# Patient Record
Sex: FEMALE | Race: WHITE | NOT HISPANIC OR LATINO | Employment: FULL TIME | URBAN - METROPOLITAN AREA
[De-identification: names, ages, dates, MRNs, and addresses within clinical notes are randomized per-mention and may not be internally consistent; named-entity substitution may affect disease eponyms.]

---

## 2023-01-25 ENCOUNTER — LAB REQUISITION (OUTPATIENT)
Dept: LAB | Facility: CLINIC | Age: 23
End: 2023-01-25
Payer: COMMERCIAL

## 2023-01-25 DIAGNOSIS — R10.13 EPIGASTRIC PAIN: ICD-10-CM

## 2023-01-25 DIAGNOSIS — R00.2 PALPITATIONS: ICD-10-CM

## 2023-01-25 DIAGNOSIS — Z32.01 PREGNANCY TEST POSITIVE: ICD-10-CM

## 2023-01-25 LAB
ALBUMIN SERPL BCG-MCNC: 4 G/DL (ref 3.5–5.2)
ALP SERPL-CCNC: 142 U/L (ref 35–104)
ALT SERPL W P-5'-P-CCNC: 18 U/L (ref 10–35)
ANION GAP SERPL CALCULATED.3IONS-SCNC: 13 MMOL/L (ref 7–15)
AST SERPL W P-5'-P-CCNC: 15 U/L (ref 10–35)
BILIRUB SERPL-MCNC: 0.2 MG/DL
BUN SERPL-MCNC: 8.7 MG/DL (ref 6–20)
CALCIUM SERPL-MCNC: 8.6 MG/DL (ref 8.6–10)
CHLORIDE SERPL-SCNC: 105 MMOL/L (ref 98–107)
CREAT SERPL-MCNC: 0.55 MG/DL (ref 0.51–0.95)
DEPRECATED HCO3 PLAS-SCNC: 18 MMOL/L (ref 22–29)
GFR SERPL CREATININE-BSD FRML MDRD: >90 ML/MIN/1.73M2
GLUCOSE SERPL-MCNC: 99 MG/DL (ref 70–99)
MAGNESIUM SERPL-MCNC: 2 MG/DL (ref 1.7–2.3)
POTASSIUM SERPL-SCNC: 3.8 MMOL/L (ref 3.4–5.3)
PROT SERPL-MCNC: 6.9 G/DL (ref 6.4–8.3)
SODIUM SERPL-SCNC: 136 MMOL/L (ref 136–145)
TSH SERPL DL<=0.005 MIU/L-ACNC: 2.9 UIU/ML (ref 0.3–4.2)

## 2023-01-25 PROCEDURE — 80053 COMPREHEN METABOLIC PANEL: CPT

## 2023-01-25 PROCEDURE — 83735 ASSAY OF MAGNESIUM: CPT | Mod: ORL

## 2023-01-25 PROCEDURE — 84443 ASSAY THYROID STIM HORMONE: CPT | Mod: ORL

## 2023-01-27 ENCOUNTER — TRANSCRIBE ORDERS (OUTPATIENT)
Dept: OTHER | Age: 23
End: 2023-01-27

## 2023-01-27 DIAGNOSIS — G89.29 CHRONIC PAIN OF BOTH KNEES: Primary | ICD-10-CM

## 2023-01-27 DIAGNOSIS — M25.562 CHRONIC PAIN OF BOTH KNEES: Primary | ICD-10-CM

## 2023-01-27 DIAGNOSIS — M25.561 CHRONIC PAIN OF BOTH KNEES: Primary | ICD-10-CM

## 2023-01-30 ENCOUNTER — LAB REQUISITION (OUTPATIENT)
Dept: LAB | Facility: CLINIC | Age: 23
End: 2023-01-30
Payer: COMMERCIAL

## 2023-01-30 DIAGNOSIS — Z32.01 PREGNANCY TEST POSITIVE: Primary | ICD-10-CM

## 2023-01-30 DIAGNOSIS — R10.13 EPIGASTRIC PAIN: ICD-10-CM

## 2023-01-30 PROCEDURE — 87338 HPYLORI STOOL AG IA: CPT

## 2023-01-31 LAB — H PYLORI AG STL QL IA: POSITIVE

## 2023-02-13 ENCOUNTER — HOSPITAL ENCOUNTER (OUTPATIENT)
Dept: ULTRASOUND IMAGING | Facility: CLINIC | Age: 23
Discharge: HOME | End: 2023-02-13
Attending: ADVANCED PRACTICE MIDWIFE | Admitting: ADVANCED PRACTICE MIDWIFE
Payer: COMMERCIAL

## 2023-02-13 DIAGNOSIS — Z32.01 PREGNANCY TEST POSITIVE: ICD-10-CM

## 2023-02-13 PROCEDURE — 76801 OB US < 14 WKS SINGLE FETUS: CPT | Mod: 26 | Performed by: RADIOLOGY

## 2023-02-13 PROCEDURE — 76801 OB US < 14 WKS SINGLE FETUS: CPT

## 2023-02-20 ENCOUNTER — LAB REQUISITION (OUTPATIENT)
Dept: LAB | Facility: CLINIC | Age: 23
End: 2023-02-20
Payer: COMMERCIAL

## 2023-02-20 DIAGNOSIS — O09.91 SUPERVISION OF HIGH RISK PREGNANCY, UNSPECIFIED, FIRST TRIMESTER: ICD-10-CM

## 2023-02-20 LAB
ABO/RH(D): NORMAL
ANTIBODY SCREEN: NEGATIVE
DEPRECATED CALCIDIOL+CALCIFEROL SERPL-MC: 4 UG/L (ref 20–75)
HBV SURFACE AB SERPL IA-ACNC: 4.45 M[IU]/ML
HBV SURFACE AB SERPL IA-ACNC: NONREACTIVE M[IU]/ML
HBV SURFACE AG SERPL QL IA: NONREACTIVE
HCV AB SERPL QL IA: NONREACTIVE
SPECIMEN EXPIRATION DATE: NORMAL

## 2023-02-20 PROCEDURE — 86762 RUBELLA ANTIBODY: CPT | Mod: ORL | Performed by: ADVANCED PRACTICE MIDWIFE

## 2023-02-20 PROCEDURE — 86787 VARICELLA-ZOSTER ANTIBODY: CPT | Mod: ORL | Performed by: ADVANCED PRACTICE MIDWIFE

## 2023-02-20 PROCEDURE — 87086 URINE CULTURE/COLONY COUNT: CPT | Mod: ORL | Performed by: ADVANCED PRACTICE MIDWIFE

## 2023-02-20 PROCEDURE — 86780 TREPONEMA PALLIDUM: CPT | Mod: ORL | Performed by: ADVANCED PRACTICE MIDWIFE

## 2023-02-20 PROCEDURE — 86901 BLOOD TYPING SEROLOGIC RH(D): CPT | Mod: ORL | Performed by: ADVANCED PRACTICE MIDWIFE

## 2023-02-20 PROCEDURE — 85660 RBC SICKLE CELL TEST: CPT | Mod: ORL | Performed by: ADVANCED PRACTICE MIDWIFE

## 2023-02-20 PROCEDURE — 86706 HEP B SURFACE ANTIBODY: CPT | Mod: ORL | Performed by: ADVANCED PRACTICE MIDWIFE

## 2023-02-20 PROCEDURE — 83020 HEMOGLOBIN ELECTROPHORESIS: CPT | Mod: ORL | Performed by: ADVANCED PRACTICE MIDWIFE

## 2023-02-20 PROCEDURE — 82306 VITAMIN D 25 HYDROXY: CPT | Mod: ORL | Performed by: ADVANCED PRACTICE MIDWIFE

## 2023-02-20 PROCEDURE — 86850 RBC ANTIBODY SCREEN: CPT | Mod: ORL | Performed by: ADVANCED PRACTICE MIDWIFE

## 2023-02-20 PROCEDURE — 86803 HEPATITIS C AB TEST: CPT | Mod: ORL | Performed by: ADVANCED PRACTICE MIDWIFE

## 2023-02-20 PROCEDURE — 87340 HEPATITIS B SURFACE AG IA: CPT | Mod: ORL | Performed by: ADVANCED PRACTICE MIDWIFE

## 2023-02-21 LAB
RUBV IGG SERPL QL IA: 16.5 INDEX
RUBV IGG SERPL QL IA: POSITIVE
T PALLIDUM AB SER QL: NONREACTIVE
VZV IGG SER QL IA: 606 INDEX
VZV IGG SER QL IA: POSITIVE

## 2023-02-22 LAB
BACTERIA UR CULT: NO GROWTH
HGB A1 MFR BLD: 96.8 %
HGB A2 MFR BLD: 3 %
HGB C MFR BLD: 0 %
HGB E MFR BLD: 0 %
HGB F MFR BLD: 0.2 %
HGB FRACT BLD ELPH-IMP: NORMAL
HGB OTHER MFR BLD: 0 %
HGB S BLD QL SOLY: NORMAL
HGB S MFR BLD: 0 %
PATH INTERP BLD-IMP: NORMAL

## 2023-03-08 ENCOUNTER — HOSPITAL ENCOUNTER (EMERGENCY)
Facility: CLINIC | Age: 23
Discharge: HOME | End: 2023-03-08
Attending: EMERGENCY MEDICINE | Admitting: EMERGENCY MEDICINE
Payer: COMMERCIAL

## 2023-03-08 VITALS — OXYGEN SATURATION: 100 %

## 2023-03-08 DIAGNOSIS — O21.0 HYPEREMESIS GRAVIDARUM: ICD-10-CM

## 2023-03-08 LAB
ANION GAP SERPL CALCULATED.3IONS-SCNC: 9 MMOL/L (ref 7–15)
BUN SERPL-MCNC: 6.2 MG/DL (ref 6–20)
CALCIUM SERPL-MCNC: 9.3 MG/DL (ref 8.6–10)
CHLORIDE SERPL-SCNC: 104 MMOL/L (ref 98–107)
CREAT SERPL-MCNC: 0.47 MG/DL (ref 0.51–0.95)
DEPRECATED HCO3 PLAS-SCNC: 22 MMOL/L (ref 22–29)
GFR SERPL CREATININE-BSD FRML MDRD: >90 ML/MIN/1.73M2
GLUCOSE SERPL-MCNC: 82 MG/DL (ref 70–99)
HGB BLD-MCNC: 11.8 G/DL (ref 11.7–15.7)
HOLD SPECIMEN: NORMAL
HOLD SPECIMEN: NORMAL
POTASSIUM SERPL-SCNC: 3.9 MMOL/L (ref 3.4–5.3)
SODIUM SERPL-SCNC: 135 MMOL/L (ref 136–145)

## 2023-03-08 PROCEDURE — 96374 THER/PROPH/DIAG INJ IV PUSH: CPT

## 2023-03-08 PROCEDURE — 99284 EMERGENCY DEPT VISIT MOD MDM: CPT | Mod: 25

## 2023-03-08 PROCEDURE — 258N000003 HC RX IP 258 OP 636: Performed by: EMERGENCY MEDICINE

## 2023-03-08 PROCEDURE — 85018 HEMOGLOBIN: CPT | Performed by: EMERGENCY MEDICINE

## 2023-03-08 PROCEDURE — 36415 COLL VENOUS BLD VENIPUNCTURE: CPT | Performed by: EMERGENCY MEDICINE

## 2023-03-08 PROCEDURE — 82310 ASSAY OF CALCIUM: CPT | Performed by: EMERGENCY MEDICINE

## 2023-03-08 PROCEDURE — 96361 HYDRATE IV INFUSION ADD-ON: CPT

## 2023-03-08 PROCEDURE — 250N000011 HC RX IP 250 OP 636: Performed by: EMERGENCY MEDICINE

## 2023-03-08 RX ORDER — ONDANSETRON 2 MG/ML
4 INJECTION INTRAMUSCULAR; INTRAVENOUS
Status: COMPLETED | OUTPATIENT
Start: 2023-03-08 | End: 2023-03-08

## 2023-03-08 RX ORDER — PROCHLORPERAZINE MALEATE 10 MG
10 TABLET ORAL EVERY 6 HOURS PRN
Qty: 20 TABLET | Refills: 0 | Status: ON HOLD | OUTPATIENT
Start: 2023-03-08 | End: 2023-09-21

## 2023-03-08 RX ADMIN — SODIUM CHLORIDE 1000 ML: 9 INJECTION, SOLUTION INTRAVENOUS at 20:42

## 2023-03-08 RX ADMIN — ONDANSETRON 4 MG: 2 INJECTION INTRAMUSCULAR; INTRAVENOUS at 20:45

## 2023-03-08 ASSESSMENT — ACTIVITIES OF DAILY LIVING (ADL): ADLS_ACUITY_SCORE: 35

## 2023-03-09 NOTE — ED TRIAGE NOTES
BIBA from home with ; pt is 2-3 months pregnant and having nausea and vomiting; onset a couple days ago. Croatian speaking only.  speaks some english     Triage Assessment     Row Name 03/08/23 2025       Triage Assessment (Adult)    Airway WDL WDL       Respiratory WDL    Respiratory WDL WDL       Skin Circulation/Temperature WDL    Skin Circulation/Temperature WDL WDL       Cardiac WDL    Cardiac WDL WDL       Peripheral/Neurovascular WDL    Peripheral Neurovascular WDL WDL       Cognitive/Neuro/Behavioral WDL    Cognitive/Neuro/Behavioral WDL WDL

## 2023-03-09 NOTE — ED PROVIDER NOTES
"  History     Chief Complaint:  Nausea & Vomiting (2-3 months pregnant)       CANDY Sim is a 23 year old female with a history of 2 prior live births and a current pregnancy at 8 weeks by dates presents to us because of nausea and vomiting.  She notes that she had issues with hyperemesis during prior pregnancies as well.  She notes that she suffers 2-3 spells of emesis each day and feels generally nauseated.  However, she has been able to tolerate liquids and has been urinating and having normal bowel movements.  She denies abdominal pain or vaginal bleeding.  She visited with her primary care doctor earlier today who was prescribed Zofran, but she states that after taking a tablet that \"it did not work and I threw up again.\"  She denies other complaints at this juncture.      Independent Historian:   None - Patient Only with history obtained through an     Review of External Notes: Yes I reviewed her family practice notes from earlier today    ROS:  Review of Systems  Pertinent positives and negatives as above.  A 14-point review of systems was performed with all other systems reviewed as negative.    Allergies:  Penicillins     Medications:    Zofran ODT  Prenatal ferrous fumarate    Past Medical History:    High risk pregnancy, first trimester    Past Surgical History:    Appendectomy     Social History:  Presents to the ED via EMS with a family member.  PCP: No primary care provider on file.     Physical Exam     Patient Vitals for the past 24 hrs:   SpO2   03/08/23 2030 100 %     Physical Exam    Eye:  Pupils are equal, round, and reactive.  Extraocular movements intact.    ENT:  No rhinorrhea.  Moist mucus membranes.  Normal tongue and tonsil.    Cardiac:  Regular rate and rhythm.  No murmurs, gallops, or rubs.    Pulmonary:  Clear to auscultation bilaterally.  No wheezes, rales, or rhonchi.    Abdomen:  Positive bowel sounds.  Abdomen is soft and non-distended, without focal " tenderness.    Musculoskeletal:  Normal movement of all extremities without evidence for deficit.    Skin:  Warm and dry without rashes.    Neurologic:  Non-focal exam without asymmetric weakness or numbness.     Psychiatric:  Normal affect with appropriate interaction with examiner.    Emergency Department Course     Laboratory:  Labs Ordered and Resulted from Time of ED Arrival to Time of ED Departure   BASIC METABOLIC PANEL - Abnormal       Result Value    Sodium 135 (*)     Potassium 3.9      Chloride 104      Carbon Dioxide (CO2) 22      Anion Gap 9      Urea Nitrogen 6.2      Creatinine 0.47 (*)     Calcium 9.3      Glucose 82      GFR Estimate >90     HEMOGLOBIN - Normal    Hemoglobin 11.8       Emergency Department Course & Assessments:  Interventions:  Medications   ondansetron (ZOFRAN) injection 4 mg (4 mg Intravenous $Given 3/8/23 2045)   0.9% sodium chloride BOLUS (0 mLs Intravenous Stopped 3/8/23 2228)     Assessments:  2205 I obtained history and examined the patient as noted above. I believe that they are safe for discharge at this time.     Social Determinants of Health affecting care:   None    Disposition:  The patient was discharged to home.     Impression & Plan    CMS Diagnoses: None      Medical Decision Making:  This healthy 23-year-old woman presents to us with hyperemesis gravidarum.  Vital signs are reassuring.  She appears clinically well.  Her abdomen is soft and benign.  She has moist mucous membranes.  She was given a liter of fluid along with Zofran with improvement in symptoms.  Chemistry panel is reassuring.  Hemoglobin is normal.  At this juncture, I feel she is safe for discharge home.  We discussed hyperemesis related to pregnancy.  She will continue with Zofran we will consider a trial of Compazine as well.  She will otherwise return to us for worsening of condition or other emergent concerns.    Diagnosis:    ICD-10-CM    1. Hyperemesis gravidarum  O21.0         Discharge  Medications:  Discharge Medication List as of 3/8/2023 10:28 PM      START taking these medications    Details   prochlorperazine (COMPAZINE) 10 MG tablet Take 1 tablet (10 mg) by mouth every 6 hours as needed for nausea or vomiting, Disp-20 tablet, R-0, Local Print           Scribe Disclosure:  I, Jayson Dan, am serving as a scribe at 10:17 PM on 3/8/2023 to document services personally performed by Trierweiler, Chad A, MD based on my observations and the provider's statements to me.   3/8/2023   Trierweiler, Chad A, MD Trierweiler, Chad A, MD  03/09/23 0945

## 2023-03-13 ENCOUNTER — LAB REQUISITION (OUTPATIENT)
Dept: LAB | Facility: CLINIC | Age: 23
End: 2023-03-13
Payer: COMMERCIAL

## 2023-03-13 DIAGNOSIS — O09.91 SUPERVISION OF HIGH RISK PREGNANCY, UNSPECIFIED, FIRST TRIMESTER: ICD-10-CM

## 2023-03-13 PROCEDURE — 87591 N.GONORRHOEAE DNA AMP PROB: CPT | Mod: ORL | Performed by: MIDWIFE

## 2023-03-13 PROCEDURE — 87491 CHLMYD TRACH DNA AMP PROBE: CPT | Performed by: MIDWIFE

## 2023-03-14 LAB
C TRACH DNA SPEC QL NAA+PROBE: NEGATIVE
N GONORRHOEA DNA SPEC QL NAA+PROBE: NEGATIVE

## 2023-03-19 ENCOUNTER — NURSE TRIAGE (OUTPATIENT)
Dept: NURSING | Facility: CLINIC | Age: 23
End: 2023-03-19
Payer: COMMERCIAL

## 2023-03-19 NOTE — TELEPHONE ENCOUNTER
obtained. [id number].  Her IV has been late since yesterday evening. They need to put in another IV because the other one removed from her skin. I connected them to [facility] because that is their clinic. I told them they have to talk to the MD on call to find out what they want him to do.  [phone]. The  told him this and then I connected them to put a page out to the on call MD.  Alia Santizo, RN  [facility]    Reason for Disposition    IV not running or running slowly    [1] Caller has URGENT question AND [2] triager unable to answer question    Additional Information    Negative: SEVERE difficulty breathing (e.g., struggling for each breath, speaks in single words)    Negative: Shock suspected (e.g., cold/pale/clammy skin, too weak to stand, low BP, rapid pulse)    Negative: Cracked or broken central line or PICC Line    Negative: Sounds like a life-threatening emergency to the triager    Negative: [1] Pain, redness, swelling, or pus at IV site AND [2] IV running normally    Protocols used: IV SITE (SKIN) SYMPTOMS-A-AH, IV NOT RUNNING OR RUNNING SLOWLY-A-AH

## 2023-03-31 ENCOUNTER — LAB REQUISITION (OUTPATIENT)
Dept: LAB | Facility: CLINIC | Age: 23
End: 2023-03-31
Payer: COMMERCIAL

## 2023-03-31 DIAGNOSIS — J02.9 ACUTE PHARYNGITIS, UNSPECIFIED: ICD-10-CM

## 2023-03-31 PROCEDURE — 87081 CULTURE SCREEN ONLY: CPT | Performed by: MIDWIFE

## 2023-04-02 LAB — BACTERIA SPEC CULT: NORMAL

## 2023-04-10 ENCOUNTER — TRANSCRIBE ORDERS (OUTPATIENT)
Dept: MATERNAL FETAL MEDICINE | Facility: CLINIC | Age: 23
End: 2023-04-10
Payer: COMMERCIAL

## 2023-04-10 ENCOUNTER — LAB REQUISITION (OUTPATIENT)
Dept: LAB | Facility: CLINIC | Age: 23
End: 2023-04-10
Payer: COMMERCIAL

## 2023-04-10 DIAGNOSIS — O26.90 PREGNANCY RELATED CONDITION: Primary | ICD-10-CM

## 2023-04-10 DIAGNOSIS — Z36.89 ENCOUNTER FOR FETAL ANATOMIC SURVEY: Primary | ICD-10-CM

## 2023-04-10 DIAGNOSIS — O09.92 SUPERVISION OF HIGH RISK PREGNANCY, UNSPECIFIED, SECOND TRIMESTER: ICD-10-CM

## 2023-04-10 PROCEDURE — 81511 FTL CGEN ABNOR FOUR ANAL: CPT | Mod: ORL | Performed by: ADVANCED PRACTICE MIDWIFE

## 2023-04-12 LAB
# FETUSES US: NORMAL
AFP MOM SERPL: 1.92
AFP SERPL-MCNC: 69 NG/ML
AGE - REPORTED: 23.7 YR
CURRENT SMOKER: NO
FAMILY MEMBER DISEASES HX: NO
GA METHOD: NORMAL
GA: NORMAL WK
HCG MOM SERPL: 1.14
HCG SERPL-ACNC: NORMAL IU/L
HX OF HEREDITARY DISORDERS: NO
IDDM PATIENT QL: NO
INHIBIN A MOM SERPL: 0.77
INHIBIN A SERPL-MCNC: 113 PG/ML
INTEGRATED SCN PATIENT-IMP: NORMAL
PATHOLOGY STUDY: NORMAL
SPECIMEN DRAWN SERPL: NORMAL
U ESTRIOL MOM SERPL: 0.9
U ESTRIOL SERPL-MCNC: 1.03 NG/ML

## 2023-04-27 ENCOUNTER — TELEPHONE (OUTPATIENT)
Dept: OBGYN | Facility: CLINIC | Age: 23
End: 2023-04-27
Payer: COMMERCIAL

## 2023-04-27 NOTE — TELEPHONE ENCOUNTER
Received call from ISAURA Molina from  [facility], requesting orders to discharge from services, hasn't been needing IV fluids for a few weeks. Requesting verbal OK to discharge, call back at [phone].

## 2023-04-27 NOTE — TELEPHONE ENCOUNTER
Returned call to Tracy. Reached , left message that it's OK to discharge from [facility]. Provided clinic number to call back with questions/concerns.

## 2023-05-01 ENCOUNTER — PRE VISIT (OUTPATIENT)
Dept: MATERNAL FETAL MEDICINE | Facility: CLINIC | Age: 23
End: 2023-05-01
Payer: COMMERCIAL

## 2023-05-03 ENCOUNTER — HOSPITAL ENCOUNTER (EMERGENCY)
Facility: CLINIC | Age: 23
End: 2023-05-03
Payer: COMMERCIAL

## 2023-05-08 ENCOUNTER — OFFICE VISIT (OUTPATIENT)
Dept: MATERNAL FETAL MEDICINE | Facility: CLINIC | Age: 23
End: 2023-05-08
Attending: ADVANCED PRACTICE MIDWIFE
Payer: COMMERCIAL

## 2023-05-08 ENCOUNTER — HOSPITAL ENCOUNTER (OUTPATIENT)
Dept: ULTRASOUND IMAGING | Facility: CLINIC | Age: 23
Discharge: HOME | End: 2023-05-08
Attending: ADVANCED PRACTICE MIDWIFE
Payer: COMMERCIAL

## 2023-05-08 DIAGNOSIS — O26.90 PREGNANCY RELATED CONDITION: ICD-10-CM

## 2023-05-08 DIAGNOSIS — O43.199 MARGINAL INSERTION OF UMBILICAL CORD AFFECTING MANAGEMENT OF MOTHER: Primary | ICD-10-CM

## 2023-05-08 PROCEDURE — 99203 OFFICE O/P NEW LOW 30 MIN: CPT | Mod: 25 | Performed by: OBSTETRICS & GYNECOLOGY

## 2023-05-08 PROCEDURE — 76811 OB US DETAILED SNGL FETUS: CPT

## 2023-05-08 PROCEDURE — 76811 OB US DETAILED SNGL FETUS: CPT | Mod: 26 | Performed by: OBSTETRICS & GYNECOLOGY

## 2023-05-08 PROCEDURE — 76805 OB US >/= 14 WKS SNGL FETUS: CPT

## 2023-05-08 NOTE — NURSING NOTE
IPAD [employer]  used for M ultrasound and office visit. Patient reports  no pain, no contractions, leaking of fluid, or bleeding.  SBAR given to MFM MD, see their note in Epic.

## 2023-05-08 NOTE — PROGRESS NOTES
Please see full imaging report from ViewPoint program under imaging tab.    Thank-you for referring your patient for a comprehensive ultrasound (initial ordered as fetal survey but changed to comprehensive anatomy due to finding of placental cord abnormality.    I discussed the findings on today's ultrasound with the patient and her partner using a [employer]  via iPad. I reviewed the limitations of ultrasound both in detecting aneuploidy and structural abnormalities.  Ultrasound can routinely detect 80-90% of structural abnormalities. She had low risk cell free fetal DNA for genetic screening this pregnancy and a normal msAFP.    We reviewed the finding of a marginal cord insertion, and the need for surveillance due to potential progression to a velamentous cord insertion. We have recommended a follow up US here at [facility] at 28 weeks and we will scheduled this.     Return to primary provider for continued prenatal care.    If you have questions regarding today's evaluation or if we can be of further service, please contact the [facility].     I spent a total of 17 minutes on the date of this encounter including preparing to see the patient (reviewing medical records/tests), counseling and discussing the plan of care, documenting the visit in the electronic medical record, and communicating with other health care professionals and/or care coordination.    Oliver Joya MD  Maternal Fetal Medicine

## 2023-05-09 PROBLEM — O43.199 MARGINAL INSERTION OF UMBILICAL CORD AFFECTING MANAGEMENT OF MOTHER: Status: ACTIVE | Noted: 2023-05-09

## 2023-05-09 PROBLEM — E55.9 VITAMIN D DEFICIENCY: Status: ACTIVE | Noted: 2023-02-22

## 2023-05-09 PROBLEM — H10.13 ALLERGIC CONJUNCTIVITIS OF BOTH EYES: Status: ACTIVE | Noted: 2022-06-12

## 2023-05-09 PROBLEM — A04.8 HELICOBACTER PYLORI INFECTION: Status: ACTIVE | Noted: 2023-02-06

## 2023-05-09 PROBLEM — O09.91 HRP (HIGH RISK PREGNANCY), FIRST TRIMESTER: Status: ACTIVE | Noted: 2023-02-20

## 2023-05-09 PROBLEM — O21.0 HYPEREMESIS AFFECTING PREGNANCY, ANTEPARTUM: Status: ACTIVE | Noted: 2023-03-13

## 2023-08-07 ENCOUNTER — HOSPITAL ENCOUNTER (OUTPATIENT)
Dept: ULTRASOUND IMAGING | Facility: CLINIC | Age: 23
Discharge: HOME | End: 2023-08-07
Attending: OBSTETRICS & GYNECOLOGY
Payer: COMMERCIAL

## 2023-08-07 ENCOUNTER — LAB REQUISITION (OUTPATIENT)
Dept: LAB | Facility: CLINIC | Age: 23
End: 2023-08-07
Payer: COMMERCIAL

## 2023-08-07 ENCOUNTER — OFFICE VISIT (OUTPATIENT)
Dept: MATERNAL FETAL MEDICINE | Facility: CLINIC | Age: 23
End: 2023-08-07
Attending: OBSTETRICS & GYNECOLOGY
Payer: COMMERCIAL

## 2023-08-07 DIAGNOSIS — O43.199 MARGINAL INSERTION OF UMBILICAL CORD AFFECTING MANAGEMENT OF MOTHER: ICD-10-CM

## 2023-08-07 DIAGNOSIS — O09.93 SUPERVISION OF HIGH RISK PREGNANCY, UNSPECIFIED, THIRD TRIMESTER: ICD-10-CM

## 2023-08-07 DIAGNOSIS — O43.193 MARGINAL INSERTION OF UMBILICAL CORD AFFECTING MANAGEMENT OF MOTHER IN THIRD TRIMESTER: Primary | ICD-10-CM

## 2023-08-07 PROCEDURE — 86780 TREPONEMA PALLIDUM: CPT | Mod: ORL | Performed by: ADVANCED PRACTICE MIDWIFE

## 2023-08-07 PROCEDURE — 76816 OB US FOLLOW-UP PER FETUS: CPT

## 2023-08-07 PROCEDURE — 76816 OB US FOLLOW-UP PER FETUS: CPT | Mod: 26 | Performed by: OBSTETRICS & GYNECOLOGY

## 2023-08-07 PROCEDURE — 82306 VITAMIN D 25 HYDROXY: CPT | Mod: ORL | Performed by: ADVANCED PRACTICE MIDWIFE

## 2023-08-07 NOTE — PROGRESS NOTES
Please see full imaging report from ViewPoint program under imaging tab.    Oliver Joya MD  Maternal Fetal Medicine

## 2023-08-07 NOTE — NURSING NOTE
Patient presents to M for RL2. Positive fetal movement. Denies LOF, vaginal bleeding or cramping/contractions. SBAR given to TADEO MD, see their note in Epic.

## 2023-08-08 LAB
DEPRECATED CALCIDIOL+CALCIFEROL SERPL-MC: 6 UG/L (ref 20–75)
T PALLIDUM AB SER QL: NONREACTIVE

## 2023-08-21 ENCOUNTER — LAB REQUISITION (OUTPATIENT)
Dept: LAB | Facility: CLINIC | Age: 23
End: 2023-08-21
Payer: COMMERCIAL

## 2023-08-21 DIAGNOSIS — O09.91 SUPERVISION OF HIGH RISK PREGNANCY, UNSPECIFIED, FIRST TRIMESTER: ICD-10-CM

## 2023-08-21 PROCEDURE — 87081 CULTURE SCREEN ONLY: CPT | Mod: ORL | Performed by: ADVANCED PRACTICE MIDWIFE

## 2023-08-24 LAB — BACTERIA SPEC CULT: NORMAL

## 2023-09-20 ENCOUNTER — HOSPITAL ENCOUNTER (INPATIENT)
Facility: CLINIC | Age: 23
LOS: 2 days | Discharge: HOME HEALTH | End: 2023-09-22
Attending: ADVANCED PRACTICE MIDWIFE | Admitting: ADVANCED PRACTICE MIDWIFE
Payer: MEDICAID

## 2023-09-20 PROBLEM — O24.419 GDM (GESTATIONAL DIABETES MELLITUS): Status: ACTIVE | Noted: 2023-08-21

## 2023-09-20 PROBLEM — G43.009 MIGRAINE WITHOUT AURA, NOT INTRACTABLE: Status: ACTIVE | Noted: 2023-08-28

## 2023-09-20 LAB
ABO/RH(D): NORMAL
ANTIBODY SCREEN: NEGATIVE
ERYTHROCYTE [DISTWIDTH] IN BLOOD BY AUTOMATED COUNT: 14.1 % (ref 10–15)
GLUCOSE BLDC GLUCOMTR-MCNC: 109 MG/DL (ref 70–99)
GLUCOSE BLDC GLUCOMTR-MCNC: 128 MG/DL (ref 70–99)
GLUCOSE BLDC GLUCOMTR-MCNC: 72 MG/DL (ref 70–99)
GLUCOSE BLDC GLUCOMTR-MCNC: 95 MG/DL (ref 70–99)
HCT VFR BLD AUTO: 34.2 % (ref 35–47)
HGB BLD-MCNC: 11.3 G/DL (ref 11.7–15.7)
MCH RBC QN AUTO: 26.9 PG (ref 26.5–33)
MCHC RBC AUTO-ENTMCNC: 33 G/DL (ref 31.5–36.5)
MCV RBC AUTO: 81 FL (ref 78–100)
PLATELET # BLD AUTO: 187 10E3/UL (ref 150–450)
RBC # BLD AUTO: 4.2 10E6/UL (ref 3.8–5.2)
SARS-COV-2 RNA RESP QL NAA+PROBE: NEGATIVE
SPECIMEN EXPIRATION DATE: NORMAL
T PALLIDUM AB SER QL: NONREACTIVE
WBC # BLD AUTO: 11.5 10E3/UL (ref 4–11)

## 2023-09-20 PROCEDURE — C9803 HOPD COVID-19 SPEC COLLECT: HCPCS

## 2023-09-20 PROCEDURE — 59409 OBSTETRICAL CARE: CPT | Performed by: ADVANCED PRACTICE MIDWIFE

## 2023-09-20 PROCEDURE — 722N000001 HC LABOR CARE VAGINAL DELIVERY SINGLE

## 2023-09-20 PROCEDURE — 250N000011 HC RX IP 250 OP 636: Performed by: ADVANCED PRACTICE MIDWIFE

## 2023-09-20 PROCEDURE — 120N000002 HC R&B MED SURG/OB UMMC

## 2023-09-20 PROCEDURE — 86780 TREPONEMA PALLIDUM: CPT | Performed by: ADVANCED PRACTICE MIDWIFE

## 2023-09-20 PROCEDURE — 87635 SARS-COV-2 COVID-19 AMP PRB: CPT | Performed by: ADVANCED PRACTICE MIDWIFE

## 2023-09-20 PROCEDURE — 86901 BLOOD TYPING SEROLOGIC RH(D): CPT | Performed by: ADVANCED PRACTICE MIDWIFE

## 2023-09-20 PROCEDURE — 86850 RBC ANTIBODY SCREEN: CPT | Performed by: ADVANCED PRACTICE MIDWIFE

## 2023-09-20 PROCEDURE — 250N000013 HC RX MED GY IP 250 OP 250 PS 637: Performed by: ADVANCED PRACTICE MIDWIFE

## 2023-09-20 PROCEDURE — 250N000009 HC RX 250: Performed by: ADVANCED PRACTICE MIDWIFE

## 2023-09-20 PROCEDURE — 85049 AUTOMATED PLATELET COUNT: CPT | Performed by: ADVANCED PRACTICE MIDWIFE

## 2023-09-20 RX ORDER — ACETAMINOPHEN 325 MG/1
650 TABLET ORAL EVERY 4 HOURS PRN
Status: DISCONTINUED | OUTPATIENT
Start: 2023-09-20 | End: 2023-09-22 | Stop reason: HOSPADM

## 2023-09-20 RX ORDER — BISACODYL 10 MG
10 SUPPOSITORY, RECTAL RECTAL DAILY PRN
Status: DISCONTINUED | OUTPATIENT
Start: 2023-09-20 | End: 2023-09-22 | Stop reason: HOSPADM

## 2023-09-20 RX ORDER — MISOPROSTOL 200 UG/1
400 TABLET ORAL
Status: DISCONTINUED | OUTPATIENT
Start: 2023-09-20 | End: 2023-09-20

## 2023-09-20 RX ORDER — METHYLERGONOVINE MALEATE 0.2 MG/ML
200 INJECTION INTRAVENOUS
Status: DISCONTINUED | OUTPATIENT
Start: 2023-09-20 | End: 2023-09-22 | Stop reason: HOSPADM

## 2023-09-20 RX ORDER — NICOTINE POLACRILEX 4 MG
15-30 LOZENGE BUCCAL
Status: DISCONTINUED | OUTPATIENT
Start: 2023-09-20 | End: 2023-09-20

## 2023-09-20 RX ORDER — MISOPROSTOL 200 UG/1
400 TABLET ORAL
Status: DISCONTINUED | OUTPATIENT
Start: 2023-09-20 | End: 2023-09-22 | Stop reason: HOSPADM

## 2023-09-20 RX ORDER — NALOXONE HYDROCHLORIDE 0.4 MG/ML
0.2 INJECTION, SOLUTION INTRAMUSCULAR; INTRAVENOUS; SUBCUTANEOUS
Status: DISCONTINUED | OUTPATIENT
Start: 2023-09-20 | End: 2023-09-20

## 2023-09-20 RX ORDER — OXYTOCIN 10 [USP'U]/ML
10 INJECTION, SOLUTION INTRAMUSCULAR; INTRAVENOUS
Status: DISCONTINUED | OUTPATIENT
Start: 2023-09-20 | End: 2023-09-22 | Stop reason: HOSPADM

## 2023-09-20 RX ORDER — TRANEXAMIC ACID 10 MG/ML
1 INJECTION, SOLUTION INTRAVENOUS EVERY 30 MIN PRN
Status: DISCONTINUED | OUTPATIENT
Start: 2023-09-20 | End: 2023-09-22 | Stop reason: HOSPADM

## 2023-09-20 RX ORDER — OXYTOCIN/0.9 % SODIUM CHLORIDE 30/500 ML
340 PLASTIC BAG, INJECTION (ML) INTRAVENOUS CONTINUOUS PRN
Status: DISCONTINUED | OUTPATIENT
Start: 2023-09-20 | End: 2023-09-20

## 2023-09-20 RX ORDER — MISOPROSTOL 200 UG/1
800 TABLET ORAL
Status: DISCONTINUED | OUTPATIENT
Start: 2023-09-20 | End: 2023-09-22 | Stop reason: HOSPADM

## 2023-09-20 RX ORDER — ACETAMINOPHEN 325 MG/1
650 TABLET ORAL EVERY 4 HOURS PRN
Status: DISCONTINUED | OUTPATIENT
Start: 2023-09-20 | End: 2023-09-20

## 2023-09-20 RX ORDER — MISOPROSTOL 200 UG/1
800 TABLET ORAL
Status: DISCONTINUED | OUTPATIENT
Start: 2023-09-20 | End: 2023-09-20

## 2023-09-20 RX ORDER — MODIFIED LANOLIN
OINTMENT (GRAM) TOPICAL
Status: DISCONTINUED | OUTPATIENT
Start: 2023-09-20 | End: 2023-09-22 | Stop reason: HOSPADM

## 2023-09-20 RX ORDER — OXYTOCIN/0.9 % SODIUM CHLORIDE 30/500 ML
340 PLASTIC BAG, INJECTION (ML) INTRAVENOUS CONTINUOUS PRN
Status: DISCONTINUED | OUTPATIENT
Start: 2023-09-20 | End: 2023-09-22 | Stop reason: HOSPADM

## 2023-09-20 RX ORDER — OXYTOCIN/0.9 % SODIUM CHLORIDE 30/500 ML
100-340 PLASTIC BAG, INJECTION (ML) INTRAVENOUS CONTINUOUS PRN
Status: DISCONTINUED | OUTPATIENT
Start: 2023-09-20 | End: 2023-09-20

## 2023-09-20 RX ORDER — NALOXONE HYDROCHLORIDE 0.4 MG/ML
0.4 INJECTION, SOLUTION INTRAMUSCULAR; INTRAVENOUS; SUBCUTANEOUS
Status: DISCONTINUED | OUTPATIENT
Start: 2023-09-20 | End: 2023-09-20

## 2023-09-20 RX ORDER — DEXTROSE MONOHYDRATE 25 G/50ML
25-50 INJECTION, SOLUTION INTRAVENOUS
Status: DISCONTINUED | OUTPATIENT
Start: 2023-09-20 | End: 2023-09-20

## 2023-09-20 RX ORDER — PROCHLORPERAZINE 25 MG
25 SUPPOSITORY, RECTAL RECTAL EVERY 12 HOURS PRN
Status: DISCONTINUED | OUTPATIENT
Start: 2023-09-20 | End: 2023-09-20

## 2023-09-20 RX ORDER — METOCLOPRAMIDE HYDROCHLORIDE 5 MG/ML
10 INJECTION INTRAMUSCULAR; INTRAVENOUS EVERY 6 HOURS PRN
Status: DISCONTINUED | OUTPATIENT
Start: 2023-09-20 | End: 2023-09-20

## 2023-09-20 RX ORDER — OXYTOCIN 10 [USP'U]/ML
10 INJECTION, SOLUTION INTRAMUSCULAR; INTRAVENOUS
Status: DISCONTINUED | OUTPATIENT
Start: 2023-09-20 | End: 2023-09-20

## 2023-09-20 RX ORDER — METHYLERGONOVINE MALEATE 0.2 MG/ML
200 INJECTION INTRAVENOUS
Status: DISCONTINUED | OUTPATIENT
Start: 2023-09-20 | End: 2023-09-20

## 2023-09-20 RX ORDER — OXYTOCIN/0.9 % SODIUM CHLORIDE 30/500 ML
PLASTIC BAG, INJECTION (ML) INTRAVENOUS
Status: DISCONTINUED
Start: 2023-09-20 | End: 2023-09-20 | Stop reason: HOSPADM

## 2023-09-20 RX ORDER — SODIUM CHLORIDE, SODIUM LACTATE, POTASSIUM CHLORIDE, CALCIUM CHLORIDE 600; 310; 30; 20 MG/100ML; MG/100ML; MG/100ML; MG/100ML
INJECTION, SOLUTION INTRAVENOUS CONTINUOUS
Status: DISCONTINUED | OUTPATIENT
Start: 2023-09-20 | End: 2023-09-20

## 2023-09-20 RX ORDER — CARBOPROST TROMETHAMINE 250 UG/ML
250 INJECTION, SOLUTION INTRAMUSCULAR
Status: DISCONTINUED | OUTPATIENT
Start: 2023-09-20 | End: 2023-09-22 | Stop reason: HOSPADM

## 2023-09-20 RX ORDER — KETOROLAC TROMETHAMINE 30 MG/ML
30 INJECTION, SOLUTION INTRAMUSCULAR; INTRAVENOUS
Status: DISCONTINUED | OUTPATIENT
Start: 2023-09-20 | End: 2023-09-20

## 2023-09-20 RX ORDER — OXYTOCIN 10 [USP'U]/ML
INJECTION, SOLUTION INTRAMUSCULAR; INTRAVENOUS
Status: DISCONTINUED
Start: 2023-09-20 | End: 2023-09-20 | Stop reason: HOSPADM

## 2023-09-20 RX ORDER — FAMOTIDINE 10 MG
10 TABLET ORAL
Status: ON HOLD | COMMUNITY
Start: 2023-08-07 | End: 2023-09-21

## 2023-09-20 RX ORDER — ONDANSETRON 4 MG/1
4 TABLET, ORALLY DISINTEGRATING ORAL EVERY 6 HOURS PRN
Status: DISCONTINUED | OUTPATIENT
Start: 2023-09-20 | End: 2023-09-20

## 2023-09-20 RX ORDER — METOCLOPRAMIDE 10 MG/1
10 TABLET ORAL EVERY 6 HOURS PRN
Status: DISCONTINUED | OUTPATIENT
Start: 2023-09-20 | End: 2023-09-20

## 2023-09-20 RX ORDER — MISOPROSTOL 200 UG/1
TABLET ORAL
Status: DISCONTINUED
Start: 2023-09-20 | End: 2023-09-20 | Stop reason: HOSPADM

## 2023-09-20 RX ORDER — IBUPROFEN 800 MG/1
800 TABLET, FILM COATED ORAL
Status: DISCONTINUED | OUTPATIENT
Start: 2023-09-20 | End: 2023-09-20

## 2023-09-20 RX ORDER — CITRIC ACID/SODIUM CITRATE 334-500MG
30 SOLUTION, ORAL ORAL
Status: DISCONTINUED | OUTPATIENT
Start: 2023-09-20 | End: 2023-09-20

## 2023-09-20 RX ORDER — OXYCODONE HYDROCHLORIDE 5 MG/1
5 TABLET ORAL ONCE
Status: COMPLETED | OUTPATIENT
Start: 2023-09-21 | End: 2023-09-20

## 2023-09-20 RX ORDER — DEXTROSE MONOHYDRATE 25 G/50ML
25-50 INJECTION, SOLUTION INTRAVENOUS
Status: DISCONTINUED | OUTPATIENT
Start: 2023-09-20 | End: 2023-09-22 | Stop reason: HOSPADM

## 2023-09-20 RX ORDER — LIDOCAINE HYDROCHLORIDE 10 MG/ML
INJECTION, SOLUTION EPIDURAL; INFILTRATION; INTRACAUDAL; PERINEURAL
Status: DISCONTINUED
Start: 2023-09-20 | End: 2023-09-20 | Stop reason: HOSPADM

## 2023-09-20 RX ORDER — FENTANYL CITRATE 50 UG/ML
100 INJECTION, SOLUTION INTRAMUSCULAR; INTRAVENOUS
Status: DISCONTINUED | OUTPATIENT
Start: 2023-09-20 | End: 2023-09-20

## 2023-09-20 RX ORDER — SODIUM CHLORIDE 9 MG/ML
INJECTION, SOLUTION INTRAVENOUS CONTINUOUS
Status: DISCONTINUED | OUTPATIENT
Start: 2023-09-20 | End: 2023-09-20

## 2023-09-20 RX ORDER — DOCUSATE SODIUM 100 MG/1
100 CAPSULE, LIQUID FILLED ORAL DAILY
Status: DISCONTINUED | OUTPATIENT
Start: 2023-09-20 | End: 2023-09-22 | Stop reason: HOSPADM

## 2023-09-20 RX ORDER — PROCHLORPERAZINE MALEATE 10 MG
10 TABLET ORAL EVERY 6 HOURS PRN
Status: DISCONTINUED | OUTPATIENT
Start: 2023-09-20 | End: 2023-09-20

## 2023-09-20 RX ORDER — TRANEXAMIC ACID 10 MG/ML
1 INJECTION, SOLUTION INTRAVENOUS EVERY 30 MIN PRN
Status: DISCONTINUED | OUTPATIENT
Start: 2023-09-20 | End: 2023-09-20

## 2023-09-20 RX ORDER — NICOTINE POLACRILEX 4 MG
15-30 LOZENGE BUCCAL
Status: DISCONTINUED | OUTPATIENT
Start: 2023-09-20 | End: 2023-09-22 | Stop reason: HOSPADM

## 2023-09-20 RX ORDER — ONDANSETRON 2 MG/ML
4 INJECTION INTRAMUSCULAR; INTRAVENOUS EVERY 6 HOURS PRN
Status: DISCONTINUED | OUTPATIENT
Start: 2023-09-20 | End: 2023-09-20

## 2023-09-20 RX ORDER — IBUPROFEN 800 MG/1
800 TABLET, FILM COATED ORAL EVERY 6 HOURS PRN
Status: DISCONTINUED | OUTPATIENT
Start: 2023-09-20 | End: 2023-09-22 | Stop reason: HOSPADM

## 2023-09-20 RX ORDER — HYDROCORTISONE 25 MG/G
CREAM TOPICAL 3 TIMES DAILY PRN
Status: DISCONTINUED | OUTPATIENT
Start: 2023-09-20 | End: 2023-09-22 | Stop reason: HOSPADM

## 2023-09-20 RX ORDER — CARBOPROST TROMETHAMINE 250 UG/ML
250 INJECTION, SOLUTION INTRAMUSCULAR
Status: DISCONTINUED | OUTPATIENT
Start: 2023-09-20 | End: 2023-09-20

## 2023-09-20 RX ADMIN — FENTANYL CITRATE 100 MCG: 50 INJECTION INTRAMUSCULAR; INTRAVENOUS at 18:58

## 2023-09-20 RX ADMIN — ACETAMINOPHEN 650 MG: 325 TABLET, FILM COATED ORAL at 20:59

## 2023-09-20 RX ADMIN — Medication 340 ML/HR: at 19:33

## 2023-09-20 RX ADMIN — KETOROLAC TROMETHAMINE 30 MG: 30 INJECTION, SOLUTION INTRAMUSCULAR; INTRAVENOUS at 19:50

## 2023-09-20 RX ADMIN — OXYCODONE HYDROCHLORIDE 5 MG: 5 TABLET ORAL at 23:40

## 2023-09-20 ASSESSMENT — ACTIVITIES OF DAILY LIVING (ADL)
ADLS_ACUITY_SCORE: 35
ADLS_ACUITY_SCORE: 18

## 2023-09-20 NOTE — PROVIDER NOTIFICATION
09/20/23 1722   Provider Notification   Provider Name/Title RAGHAVENDRA Guillaume CNM   Method of Notification Electronic Page   Request Evaluate - Remote   Notification Reason Lab/Diagnostic Study     BG just prior to planned initiation of insulin drip was 109. Per orders, will not start insulin at this time and continue with q 2 hour BG checks.

## 2023-09-20 NOTE — H&P
"ADMIT NOTE  =================  40w0d    Latasha Sim is a 23 year old female with an No LMP recorded. Patient is pregnant. and Estimated Date of Delivery: Sep 20, 2023 [facility] at 1:26 PM in early labor.     HPI  ================  Patient reports that contractions began around 0600 this morning, have been more painful and coordinated as the day progressed.  Denies fever, cough, SOB or chest pain. Denies having contact with anyone who is Covid-19 positive. Pt has had Covid-19 Vaccinations.  Agreeable to Covid-19 testing  Contractions- every 2-5 minutes  Fetal movement- active  ROM- no.  Vaginal bleeding- none  GBS- negative  FOB- is involved, at bedside  Other labor support-  present    Weight gain- 179 - 170 lbs, Total weight gain- 9 lbs  Height- 63\"  BMI- 30  First prenatal visit at 12 weeks, Total visits- 9    PROBLEM LIST  =================  Patient Active Problem List    Diagnosis Date Noted    Labor and delivery, indication for care 2023     Priority: Medium    Marginal insertion of umbilical cord affecting management of mother 2023     Priority: Medium    Hyperemesis affecting pregnancy, antepartum 2023     Priority: Medium     Formatting of this note might be different from the original.  Home infusion ordered 3/13/23.      Vitamin D deficiency 2023     Priority: Medium    HRP (high risk pregnancy), first trimester 2023     Priority: Medium     Formatting of this note might be different from the original.  [facility] CNM   Partner's name:   [x] Entered on [facility] active OB patient list  [ ] NOB folder  [x] First tri screen declined  [x ] QS/AFP ordered   [NA] Started ASA   [x ] Fetal anatomy US ordered  [ ] Rubella immune  [ ] Varicella Immune  [NI ] Hep B Immune  [ ] Pap up to date/due PP    [ ] EOB folder  [ ] FLU shot  [ ] COVID vax  [ ] GCT, passed  [ ] Rhogam if needed, date:  [ ] TOLAC consent done  [ ] Waterbirth declines,consent " done  [ ] Breast pump  [ ] Car seat  [ ] TDAP given  [ ] PP Contraception plan: If tubal,consent date:  [ ] Labor plans:  [ ] :  [ ] Infant feeding plan:   [ ] Infant pediatrician    [ ] GBS pos; neg  [ ] OTC PP meds sent      Helicobacter pylori infection 2023     Priority: Medium     Formatting of this note might be different from the original.  Treat  > 14 wks      Pregnancy test positive 2023     Priority: Medium     LMP 2022      Allergic conjunctivitis of both eyes 2022     Priority: Medium       HISTORIES  ============  Allergies   Allergen Reactions    Penicillins Rash     Uncertain if a true reaction as rash went away within 24 hours. Use caution     No past medical history on file.  Past Surgical History:   Procedure Laterality Date    APPENDECTOMY     .  No family history on file.  Social History     Tobacco Use    Smoking status: Not on file    Smokeless tobacco: Not on file   Substance Use Topics    Alcohol use: Not on file     OB History    Para Term  AB Living   3 2 2 0 0 2   SAB IAB Ectopic Multiple Live Births   0 0 0 0 2      # Outcome Date GA Lbr Av/2nd Weight Sex Delivery Anes PTL Lv   3 Current            2 Term      Vag-Spont   SURINDER   1 Term      Vag-Spont   SURINDER        LABS:   ===========  Prenatal Labs:  Rhogam not indicated   Lab Results   Component Value Date    AS Negative 2023    RUQIGG Positive 2023    HEPBANG Nonreactive 2023    HGB 11.8 2023     Rubella immune  GBS negative  1hr glucose 200         ROS  =========  Pt denies significant respiratory, cardiovacular, GI, or muscular/skeletalcomplaints.    See RN data base ROS.       PHYSICAL EXAM:  ===============  /75 (BP Location: Right arm, Patient Position: Semi-Johnston's, Cuff Size: Adult Regular)   Temp 98.2  F (36.8  C) (Oral)   Resp 18   General appearance: uncomfortable with contractions  GENERAL APPEARANCE: healthy, alert and no distress  RESP: lungs  clear to auscultation - no rales, rhonchi or wheezes  CV: regular rates and rhythm, normal S1 S2, no S3 or S4 and no murmur,and no varicosities  ABDOMEN:  soft, nontender, no epigastric pain  SKIN: no suspicious lesions or rashes  NEURO: Denies headache, blurred vision, other vision changes  PSYCH: mentation appears normal. and affect normal/bright  Legs: Reflexes normal bilaterally and No edema     Abdomen: gravid, vertex fetus per Leopold's, non-tender between contractions.   Cephalic presentation confirmed by BSUS  EFW-  7 lbs.   CONTRACTIONS: every 2-5 minutes  FETAL HEART TONES: continuous EFM- baseline 145 with moderate variability and positive accelerations. No decelerations.  PELVIC EXAM: 3/ 50%/ Posterior/ average/ 0   MCCARTHY SCORE: 5  BLOODY SHOW: no   ROM:no          ASSESSMENT:  ==============   IUP @ 40w0d admitted in early labor   GDM-A1  NST REACTIVE  Fetal Heart Tones - category one  GBS- negative  Covid- pending    Patient Active Problem List   Diagnosis    Pregnancy test positive    HRP (high risk pregnancy), first trimester    Hyperemesis affecting pregnancy, antepartum    Vitamin D deficiency    Helicobacter pylori infection    Allergic conjunctivitis of both eyes    Marginal insertion of umbilical cord affecting management of mother    Labor and delivery, indication for care    GDM (gestational diabetes mellitus)    Migraine without aura, not intractable       PLAN:  ===========  Admit - see IP orders, patient consents to routine blood work and covid testing.  Blood glucose monitoring every 2 hours due GDM A1  pain medication options of nitrous oxide, fentanyl IV and epidural anesthesia reviewed with pt. Pt is interested in keeping an open mind  Ambulation, hydration, position changes, birthing ball and tub options to facilitate labor reviewed with pt .  Anticipate   ALEX Casas CNM

## 2023-09-20 NOTE — PROGRESS NOTES
Labor progress note    S:  Patient more uncomfortable with contractions.  Consents to cervical exam at this time.    O:  Blood pressure 130/70, temperature 97.7  F (36.5  C), temperature source Oral, resp. rate 20.  General appearance: uncomfortable with contractions.  Contractions: Every 3-7 minutes.  seconds duration.  Palpate: strong.  FHT: Baseline 140 with moderate variability. Accelerations are present. no decelerations present.  ROM: not ruptured.   Pelvic exam: 4.5/ 50%/ Posterior/ soft/ -1  Pitocin- none,  Antibiotics- none  Blood glucose within goal.    A:  IUP @ 40w0d early labor and good progress   Fetal Heart rate tracing category one  GBS- negative  Patient Active Problem List   Diagnosis    Pregnancy test positive    HRP (high risk pregnancy), first trimester    Hyperemesis affecting pregnancy, antepartum    Vitamin D deficiency    Helicobacter pylori infection    Allergic conjunctivitis of both eyes    Marginal insertion of umbilical cord affecting management of mother    Labor and delivery, indication for care    GDM (gestational diabetes mellitus)    Migraine without aura, not intractable         P:  Anticipate   reevaluate in 2-4 hours/PRN  Continue Q2hr blood sugar monitoring     ALEX Casas CNM

## 2023-09-20 NOTE — PROGRESS NOTES
Labor progress note    S:  Patient has been changing positions frequently, support from  at bedside.    O:  Blood pressure 130/70, temperature 97.7  F (36.5  C), temperature source Oral, resp. rate 20.  General appearance: uncomfortable with contractions.  Contractions: Every 3-4 minutes.  seconds duration.  Palpate: strong.  FHT: Baseline 145 with moderate variability. Accelerations are present. No decelerations present.  ROM: not ruptured.   Pelvic exam: deferred  Pitocin- none,  Antibiotics- none      A:  IUP @ 40w0d active labor   Fetal Heart rate tracing category one  GBS- negative  Patient Active Problem List   Diagnosis    Pregnancy test positive    HRP (high risk pregnancy), first trimester    Hyperemesis affecting pregnancy, antepartum    Vitamin D deficiency    Helicobacter pylori infection    Allergic conjunctivitis of both eyes    Marginal insertion of umbilical cord affecting management of mother    Labor and delivery, indication for care    GDM (gestational diabetes mellitus)    Migraine without aura, not intractable         P:  Continue frequent position changes   Re-assess in 2-4 hours and as needed.  ALEX Casas CNM

## 2023-09-20 NOTE — PLAN OF CARE
Pt arrived for evaluation for labor. Contractions started this morning around 0700. Denies LOF, VB. Reports active fetal movement. EFM/toco applied. Maltese  over phone for admission. CORRIE Guillaume notified of pt arrival and status. 3/50/0. Admission assessment completed. BG 72. Continue with q 2 hr BG checks given A1GDM.

## 2023-09-21 LAB
GLUCOSE BLDC GLUCOMTR-MCNC: 102 MG/DL (ref 70–99)
GLUCOSE BLDC GLUCOMTR-MCNC: 66 MG/DL (ref 70–99)
GLUCOSE BLDC GLUCOMTR-MCNC: 71 MG/DL (ref 70–99)
GLUCOSE BLDC GLUCOMTR-MCNC: 88 MG/DL (ref 70–99)
HGB BLD-MCNC: 10.9 G/DL (ref 11.7–15.7)

## 2023-09-21 PROCEDURE — 99231 SBSQ HOSP IP/OBS SF/LOW 25: CPT | Performed by: MIDWIFE

## 2023-09-21 PROCEDURE — 85018 HEMOGLOBIN: CPT | Performed by: ADVANCED PRACTICE MIDWIFE

## 2023-09-21 PROCEDURE — 36415 COLL VENOUS BLD VENIPUNCTURE: CPT | Performed by: ADVANCED PRACTICE MIDWIFE

## 2023-09-21 PROCEDURE — 250N000013 HC RX MED GY IP 250 OP 250 PS 637: Performed by: ADVANCED PRACTICE MIDWIFE

## 2023-09-21 PROCEDURE — 120N000002 HC R&B MED SURG/OB UMMC

## 2023-09-21 RX ORDER — AMOXICILLIN 250 MG
1 CAPSULE ORAL DAILY
Qty: 100 TABLET | Refills: 0 | Status: SHIPPED | OUTPATIENT
Start: 2023-09-21 | End: 2023-09-22

## 2023-09-21 RX ORDER — ACETAMINOPHEN 325 MG/1
650 TABLET ORAL EVERY 6 HOURS PRN
Qty: 100 TABLET | Refills: 0 | Status: SHIPPED | OUTPATIENT
Start: 2023-09-21 | End: 2023-09-22

## 2023-09-21 RX ORDER — IBUPROFEN 600 MG/1
600 TABLET, FILM COATED ORAL EVERY 6 HOURS PRN
Qty: 60 TABLET | Refills: 0 | Status: SHIPPED | OUTPATIENT
Start: 2023-09-21 | End: 2023-09-22

## 2023-09-21 RX ADMIN — ACETAMINOPHEN 650 MG: 325 TABLET, FILM COATED ORAL at 06:16

## 2023-09-21 RX ADMIN — DOCUSATE SODIUM 100 MG: 100 CAPSULE, LIQUID FILLED ORAL at 09:37

## 2023-09-21 RX ADMIN — ACETAMINOPHEN 650 MG: 325 TABLET, FILM COATED ORAL at 09:37

## 2023-09-21 RX ADMIN — ACETAMINOPHEN 650 MG: 325 TABLET, FILM COATED ORAL at 14:28

## 2023-09-21 RX ADMIN — IBUPROFEN 800 MG: 800 TABLET ORAL at 09:37

## 2023-09-21 RX ADMIN — IBUPROFEN 800 MG: 800 TABLET ORAL at 15:47

## 2023-09-21 RX ADMIN — IBUPROFEN 800 MG: 800 TABLET ORAL at 02:22

## 2023-09-21 RX ADMIN — IBUPROFEN 800 MG: 800 TABLET ORAL at 23:31

## 2023-09-21 RX ADMIN — ACETAMINOPHEN 650 MG: 325 TABLET, FILM COATED ORAL at 23:31

## 2023-09-21 ASSESSMENT — ACTIVITIES OF DAILY LIVING (ADL)
ADLS_ACUITY_SCORE: 18

## 2023-09-21 NOTE — L&D DELIVERY NOTE
DELIVERY NOTE:  Latasha Sim is a 23 year old  at [facility]w0d [facility].  Labor progressed spontaneously to complete and +4.  Pushed effectively without coaching. Head delivered OA and restituted to NELSON . Tight nuchal cord x1, which was somersaulted through. Shoulders easily delivered under maternal effort.  Live female  delivered at 1926 over an intact perineum under no anesthesia.  Spontaneous breath, vigorous cry, well flexed, HR>100. Infant directly to maternal abdomen. Delayed cord clamping for >1 minutes then clamped x2 and cut by CNM. 20 units of pitocin infusing after baby with pt's consent. Cord blood obtained for typing. Intact placenta spontaneously delivered via Fox at 1930.  3 vessel cord. Fundus midline and firm with massage. Vagina, perineum, and rectum inspected, found to be intact.  Mother and infant stable. Good family bonding observed.     Delivery Note:   IUP at 40 weeks gestation delivered on 2023.     delivery of a viable Female infant.  Weight : 7 pounds 5 ounces   Apgars of 8 at 1 minute and 9 at 5 minutes.  Labor was spontaneous.  Medications administered  in labor:  Pain Rx Fentanyl and Nitrous Oxide; Antibiotics No  Perineum: Intact  Placenta-mechanism: spontaneous, intact,  with a 3 vessel cord. IV oxytocin was given After delivery of baby  Quantitative Blood Loss was 50cc.   Complications of labor and delivery: Nuchal cord  Anticipated Discharge Date: 2023  Birth attendants: Rod Lambert CNM; MD Rod Myers APRN CNM    Delivery Summary    Latasha Sim MRN# [id number]   Age: 23 year old YOB: 2000     ASSESSMENT & PLAN:   , routine PP cares       Roney, Female-Latasha [[id number]]      Rupture date/time: 23   Rupture type: Spontaneous Rupture of Membranes  Fluid color: Clear  Fluid odor: Normal       Delivery/Placenta Date and Time      Delivery Date: 23 Delivery Time:  7:26  "PM   Placenta Date/Time: 2023  7:30 PM  Oxytocin given at the time of delivery: after delivery of baby              Vaginal Counts       Initial count performed by 2 team members:  Two Team Members   stepan Lambert CNM         Needles Suture Needles Sponges (RETIRED) Instruments   Initial counts 2  5    Added to count       Relief counts       Final counts 2  5            Placed during labor Accounted for at the end of labor   FSE No NA   IUPC No NA   Cervidil No NA                      Post-Birth Team Debrief: Complete       Apgars    Living status: Living   1 Minute 5 Minute 10 Minute 15 Minute 20 Minute   Skin color: 1  1       Heart rate: 2  2       Reflex irritability: 2  2       Muscle tone: 2  2       Respiratory effort: 1  2       Total: 8  9       Apgars assigned by: KARLEE FISH RN       Cord      Vessels: 3 Vessels    Cord Complications: Nuchal               Cord Blood Disposition: Lab    Gases Sent?: No    Delayed cord clamping?: Yes    Cord Clamping Delay (seconds):  seconds    Stem cell collection?: No            Resuscitation    Methods: None   Care at Delivery: Female infant born with spontaneous cry, placed on abdomen, delayed cord clamping. Stimulated,dried, swaddled in warm blankets and hat applied. VSS. Baby feeding well. BS within normal range.  at bedside holding infant.        Measurements      Weight: 7 lb 5 oz Length: 1' 9\"     Head circumference: 35.6 cm           Skin to Skin and Feeding Plan      Skin to skin initiation date/time:       Skin to skin end date/time:     Reason skin to skin not initiated: Patient Refused       Labor Events and Shoulder Dystocia    Shoulder dystocia present?: Neg       Delivery (Maternal) (Provider to Complete) (264104)    Episiotomy: None  Perineal lacerations: None    Repair suture: None  Genital tract inspection done: Pos       Blood Loss  Mother: Latasha Sim #[id number]     Start of Mother's Information  "     Delivery Blood Loss  09/20/23 0726 - 09/20/23 2109      Delivery QBL (mL) Hospital Encounter 50 mL    Total  50 mL               End of Mother's Information  Mother: Latasha Sim #[id number]                Delivery - Provider to Complete (233997)    Delivery Type (Choose the 1 that will go to the Birth History): Vaginal, Spontaneous                                           Placenta    Date/Time: 9/20/2023  7:30 PM  Removal: Spontaneous  Comments: CONI, manjula, eliana  Disposition: Hospital disposal             Anesthesia    Method: INTRAVENOUS , Nitrous Oxide                    Presentation and Position    Presentation: Vertex    Position: Left Occiput Anterior                     ALEX Terry CNM

## 2023-09-21 NOTE — PLAN OF CARE
Data: Vital signs within normal limits. Postpartum checks within normal limits - see flow record. Patient eating and drinking normally. Patient able to empty bladder independently and is up ambulating. No apparent signs of infection.     healing well. Patient performing self cares and is able to care for infant.  Action: Patient medicated during the shift for pain. See MAR. Patient reassessed within 1 hour after each medication and pain was improved - patient stated she was comfortable. Patient education done about plan of care. See flow record.  Response: Positive attachment behaviors observed with infant. Support persons present.

## 2023-09-21 NOTE — PROGRESS NOTES
Data: Latasha Sim transferred to 7122 via wheelchair at 2207. Baby transferred via cart.  Action: Receiving unit notified of transfer: Yes. Patient and family notified of room change. Report given to Una  at 2122. Belongings sent to receiving unit. Accompanied by Registered Nurse. Oriented patient to surroundings. Call light within reach. ID bands double-checked with receiving RN.  Response: Patient tolerated transfer and is stable.

## 2023-09-21 NOTE — PROGRESS NOTES
Assumed care of patient when patient was pushing. Patient delivered a baby girl via  at 1926, apgars 8/9. EBL 50, no laceration repaired. Toradol and tylenol given, see MAR for details. Patient also using hot water bottle for pain. Baby bonding well with father, not bonding well with mother at this time. Anticipate transfer up to [facility].

## 2023-09-21 NOTE — PLAN OF CARE
Patient arrived to [facility] unit via wheelchair at 2224 ,with belongings, accompanied by spouse/ significant other, with infant in bassinet. Received report from  Sandy  and checked bands. Unit and room orientation  completed . Call light given; yes concerns present at this time, pt reports cramping rated at 8/10, provider notified. Continue with plan of care.

## 2023-09-21 NOTE — LACTATION NOTE
"Brief Lactation Consult    Met briefly with Latasha and her family. FOB states they are bottle feeding today so mother can rest and plans to start breastfeeding \"soon\".  LC reviews how milk is made and importance of early and regular breast stimulation/milk removal. Latasha shares this is how she breast fed her older children and had no issues in breastfeeding the first two times.     LC provides reassurance and reviews support available to couplet during their stay and after discharge home. Parents state they do not desire ongoing LC support. LC will complete consult at this time, please re-consult if needs arise.     Nai Dorman RN, IBCLC   Lactation Consultant  Ascom: *[phone]  Office: [phone]    "

## 2023-09-21 NOTE — PROVIDER NOTIFICATION
09/20/23 2555   Provider Notification   Provider Name/Title LACEY Lambert   Method of Notification Electronic Page   Request Evaluate-Remote   Notification Reason Medication Request     Pt is reporting uterine cramping at 8/10. Pt is  not due for tylenol or ibuprofen yet. Pt is requesting additional medication. Pls advise.    Plan: order for dose of Oxy.

## 2023-09-21 NOTE — PROVIDER NOTIFICATION
09/21/23 0659   Provider Notification   Provider Name/Title HCYNA Mari   Method of Notification Electronic Page   Request Evaluate-Remote   Notification Reason Lab Results     FYI: Fasting a.m. BG of 66. had 240 mL of apple juice and a pack of cesar crackers. BG of 71 after 15 minutes. Pt eating another pack of cesar crackers, will recheck after 15 min.

## 2023-09-21 NOTE — PLAN OF CARE
Goal Outcome Evaluation:    Vital signs WDL. Postpartum checks: WDL. Pt eating and drinking without issue. Pt ambulating and voiding independently. Pt performing self-cares. Pt medicated for pain during the shift. Formula feeding, parents state they plan to breastfeed infant but not at this time. Discussed pumping, pt declined. Lactation consult released.      BG of 66 at 0634. Pt had 240 mL of apple juice and a pack of cesar crackers.   BG of 71 at 0650. Pt ate a pack of cesar crackers, 17g of carbs.  BG of 88 at 0709. Pt had 240 mL of apple juice and a pack of cesar crackers.   Report given to oncoming RN to continue with BG checks.

## 2023-09-21 NOTE — PROGRESS NOTES
Latasha Sim      MRN#: [id number]  Age: 23 year old      YOB: 2000      PPD #1 S/P   Patient feels well and is without issue, baby is rooming in  Breast feeding status: not initiated. Bottle feeding today per parents preference  Complications since 2 hours post delivery: None  Patient is tolerating regular diet,  tolerating acitivity well, voiding without difficulty, cramping is minimal and taking ibuprofen and tylenol as needed for pain, lochia is decreasing, denies any clots.  Perineal pain is is minimal.  The perineum is intact.   Has not had a  BM yet.         SIGNIFICANT PROBLEMS:  Patient Active Problem List    Diagnosis Date Noted    Labor and delivery, indication for care 2023     Priority: Medium    Migraine without aura, not intractable 2023     Priority: Medium    GDM (gestational diabetes mellitus) 2023     Priority: Medium    Marginal insertion of umbilical cord affecting management of mother 2023     Priority: Medium     Formatting of this note might be different from the original. We reviewed the finding of a marginal cord insertion, and the need for surveillance due to potential progression to a velamentous cord insertion. We have recommended a follow up US here at [facility] at 28 weeks and we will scheduled this      Hyperemesis affecting pregnancy, antepartum 2023     Priority: Medium     Formatting of this note might be different from the original.  Home infusion ordered 3/13/23.      Vitamin D deficiency 2023     Priority: Medium    HRP (high risk pregnancy), first trimester 2023     Priority: Medium     Formatting of this note might be different from the original.  [facility] CNM   Partner's name:   [x] Entered on [facility] active OB patient list  [ ] NOB folder  [x] First tri screen declined  [x ] QS/AFP ordered   [NA] Started ASA   [x ] Fetal anatomy US ordered  [ ] Rubella immune  [ ] Varicella Immune  [NI ] Hep B Immune  [ ] Pap up to  date/due PP    [ ] EOB folder  [ ] FLU shot  [ ] COVID vax  [ ] GCT, passed  [ ] Rhogam if needed, date:  [ ] TOLAC consent done  [ ] Waterbirth declines,consent done  [ ] Breast pump  [ ] Car seat  [ ] TDAP given  [ ] PP Contraception plan: If tubal,consent date:  [ ] Labor plans:  [ ] :  [ ] Infant feeding plan:   [ ] Infant pediatrician    [ ] GBS pos; neg  [ ] OTC PP meds sent  Formatting of this note might be different from the original. [facility] CNM Partner's name: [x] Entered on [facility] active OB patient list [ ] NOB folder [x] First tri screen declined [x ] QS/AFP ordered [NA] Started ASA [x ] Fetal anatomy US ordered [x] Rubella immune [x] Varicella Immune [NI ] Hep B Immune [ ] Pap up to date/due PP [ ] EOB folder [ ] FLU shot [ ] COVID vax [x] GCT, failed [NA] Rhogam if needed, date: [NA] TOLAC consent done [NA] Waterbirth declines,consent done [-] Breast pump declined [x] Car seat [x] TDAP given [x] PP Contraception plan: undecided, does desire BC [x] Labor plans: epidural [x] : referral placed [x] Infant feeding plan: breastfeeding [x] Infant pediatrician- other clinic [x] GBS neg [ ] OTC PP meds sent      Helicobacter pylori infection 02/06/2023     Priority: Medium     Formatting of this note might be different from the original.  Treat  > 14 wks  Formatting of this note might be different from the original. Treat  > 14 wks Declines treatment per note 5/2023      Pregnancy test positive 01/25/2023     Priority: Medium     LMP November 2022      Allergic conjunctivitis of both eyes 06/12/2022     Priority: Medium     PE:    Vitals:    09/20/23 2230 09/21/23 0222 09/21/23 0615 09/21/23 0931   BP: 136/77 105/60 112/80 108/82   BP Location: Right arm Right arm Right arm Right arm   Patient Position: Semi-Johnston's Semi-Johnston's Semi-Johnston's Sitting   Cuff Size: Adult Regular Adult Regular Adult Regular Adult Regular   Pulse: 66 79 64 75   Resp: 24 16 16 16   Temp: 97.7  F (36.5  C) 97.7  F (36.5  C)  97.6  F (36.4  C) 98.8  F (37.1  C)   TempSrc: Oral Oral Oral Oral       NAD  Nipples: Intact, Non-tender  Abdomen: Soft, Non-tender      Uterus: Fundus Firm, Non-tender, located 1 cm below the umbilicus   Lochia: Rubra, appropriate amount    Perineum:  Well-approximated, healing well  Lower Extremities: trace Edema, no pain or erythema       Postpartum hemoglobin    Recent Labs   Lab 23  0832 23  1356   HGB 10.9* 11.3*     ABO: A POS   Rubella status - immune    Assessment/Plan-   Stable Post-partum day #1  Complications:none  Breast feeding  Rhophylac not indicated  GDMA1: FBS 66, treated and rechecked (71), recheck at 0709 (88), 102. Can discontinue glucose checks       Teaching done: Birth Control Options, Warning Signs/When to Call: Excessive Bleeding, Infection, PP Depression, RTC Clinic for PP Appointment, and PNV    Postpartum warning s/s reviewed, including bleeding/clots, fever, mastitis, or depression    Birthcontrol planned: Nexplanon     Current Discharge Medication List        START taking these medications    Details   acetaminophen (TYLENOL) 325 MG tablet Take 2 tablets (650 mg) by mouth every 6 hours as needed for mild pain Start after Delivery.  Qty: 100 tablet, Refills: 0    Associated Diagnoses:  (normal spontaneous vaginal delivery)      ibuprofen (ADVIL/MOTRIN) 600 MG tablet Take 1 tablet (600 mg) by mouth every 6 hours as needed for moderate pain Start after delivery  Qty: 60 tablet, Refills: 0    Associated Diagnoses:  (normal spontaneous vaginal delivery)      senna-docusate (SENOKOT-S/PERICOLACE) 8.6-50 MG tablet Take 1 tablet by mouth daily Start after delivery.  Qty: 100 tablet, Refills: 0    Associated Diagnoses:  (normal spontaneous vaginal delivery)           STOP taking these medications       famotidine (PEPCID) 10 MG tablet Comments:   Reason for Stopping:         prochlorperazine (COMPAZINE) 10 MG tablet Comments:   Reason for Stopping:               Routine  "Postpartum Management  Encouraged Postpartum videos before discharge  Anticipate discharge to home tomorrow  RTC 2 week telephone call mood check and 6 week postpartum visit     I, Delmi RIGGINS, am serving as a scribe to document services personally performed by CNM based on the provider's statements to me.\" Delmi RIGGINS    The encounter was performed by me and scribed by the SNM. The scribed note accurately reflects my personal services and decisions made by me.     ALEX Guajardo, OLENAM         "

## 2023-09-22 VITALS
RESPIRATION RATE: 16 BRPM | HEART RATE: 56 BPM | DIASTOLIC BLOOD PRESSURE: 73 MMHG | SYSTOLIC BLOOD PRESSURE: 115 MMHG | TEMPERATURE: 98.6 F

## 2023-09-22 LAB
ERYTHROCYTE [DISTWIDTH] IN BLOOD BY AUTOMATED COUNT: 14.2 % (ref 10–15)
HCT VFR BLD AUTO: 35.8 % (ref 35–47)
HGB BLD-MCNC: 11.6 G/DL (ref 11.7–15.7)
HOLD SPECIMEN: NORMAL
MCH RBC QN AUTO: 27.4 PG (ref 26.5–33)
MCHC RBC AUTO-ENTMCNC: 32.4 G/DL (ref 31.5–36.5)
MCV RBC AUTO: 84 FL (ref 78–100)
PLATELET # BLD AUTO: 163 10E3/UL (ref 150–450)
RBC # BLD AUTO: 4.24 10E6/UL (ref 3.8–5.2)
WBC # BLD AUTO: 13.8 10E3/UL (ref 4–11)

## 2023-09-22 PROCEDURE — 250N000013 HC RX MED GY IP 250 OP 250 PS 637: Performed by: REGISTERED NURSE

## 2023-09-22 PROCEDURE — 85027 COMPLETE CBC AUTOMATED: CPT | Performed by: REGISTERED NURSE

## 2023-09-22 PROCEDURE — 250N000013 HC RX MED GY IP 250 OP 250 PS 637: Performed by: ADVANCED PRACTICE MIDWIFE

## 2023-09-22 PROCEDURE — 36415 COLL VENOUS BLD VENIPUNCTURE: CPT | Performed by: REGISTERED NURSE

## 2023-09-22 RX ORDER — IBUPROFEN 600 MG/1
600 TABLET, FILM COATED ORAL EVERY 6 HOURS PRN
Qty: 60 TABLET | Refills: 0 | Status: SHIPPED | OUTPATIENT
Start: 2023-09-22

## 2023-09-22 RX ORDER — ACETAMINOPHEN 325 MG/1
650 TABLET ORAL EVERY 6 HOURS PRN
Qty: 100 TABLET | Refills: 0 | Status: SHIPPED | OUTPATIENT
Start: 2023-09-22

## 2023-09-22 RX ORDER — AMOXICILLIN 250 MG
1 CAPSULE ORAL DAILY
Qty: 100 TABLET | Refills: 0 | Status: SHIPPED | OUTPATIENT
Start: 2023-09-22

## 2023-09-22 RX ORDER — OXYCODONE HYDROCHLORIDE 5 MG/1
5 TABLET ORAL
Status: COMPLETED | OUTPATIENT
Start: 2023-09-22 | End: 2023-09-22

## 2023-09-22 RX ADMIN — ACETAMINOPHEN 650 MG: 325 TABLET, FILM COATED ORAL at 05:42

## 2023-09-22 RX ADMIN — IBUPROFEN 800 MG: 800 TABLET ORAL at 05:42

## 2023-09-22 RX ADMIN — OXYCODONE HYDROCHLORIDE 5 MG: 5 TABLET ORAL at 09:36

## 2023-09-22 RX ADMIN — IBUPROFEN 800 MG: 800 TABLET ORAL at 11:41

## 2023-09-22 RX ADMIN — DOCUSATE SODIUM 100 MG: 100 CAPSULE, LIQUID FILLED ORAL at 09:36

## 2023-09-22 RX ADMIN — ACETAMINOPHEN 650 MG: 325 TABLET, FILM COATED ORAL at 09:36

## 2023-09-22 ASSESSMENT — ACTIVITIES OF DAILY LIVING (ADL)
ADLS_ACUITY_SCORE: 18

## 2023-09-22 NOTE — PLAN OF CARE
Goal Outcome Evaluation:      Plan of Care Reviewed With: patient    Overall Patient Progress: improvingOverall Patient Progress: improving         Shift: [id number]: Shift handoff report received from ISAURA Zavala.    Data: Postpartum vaginal delivery from 9/20/23. Vital signs stable. Uterus at midline, firm. Lochia scant rubra, blood clots not present. Qandi Asifi  voiding spontaneously without difficulty, up ad jared, eating and drinking independently without nausea. Perineum appears intact,  no signs or symptoms of infection. Pumping with mild assistance, formula feeding infant. Taking ibuprofen and acetaminophen for uterine cramping pain and reports adequate pain management.   Action: Education provided on plan of care, pain management, and double breast pump initiated.  Response: Pt is agreeable with her plan of care. Positive attachment behaviors observed with infant. Support person, present. Continue with plan of care.       Erlinda Gillis RN on 9/22/2023 at 2:32 AM

## 2023-09-22 NOTE — PLAN OF CARE
Patient discharged. All education reviewed, questions addressed, medication reviewed and verified. EDS 0, BC completed, Videos reviewed. Has  Received Pump.

## 2023-09-22 NOTE — DISCHARGE INSTRUCTIONS
Postpartum Vaginal Delivery Instructions    Activity     Ask family and friends for help when you need it.  Do not place anything in your vagina for 6 weeks.  You are not restricted on other activities, but take it easy for a few weeks to allow your body to recover from delivery.  You are able to do any activities you feel up to that point.  No driving until you have stopped taking your pain medications (usually two weeks after delivery).     Call your health care provider if you have any of these symptoms:     Increased pain, swelling, redness, or fluid around your stiches from an episiotomy or perineal tear.  A fever above 100.4 F (38 C) with or without chills when placing a thermometer under your tongue.  You soak a sanitary pad with blood within 1 hour, or you see blood clots larger than a golf ball.  Bleeding that lasts more than 6 weeks.  Vaginal discharge that smells bad.  Severe pain, cramping or tenderness in your lower belly area.  A need to urinate more frequently (use the toilet more often), more urgently (use the toilet very quickly), or it burns when you urinate.  Nausea and vomiting.  Redness, swelling or pain around a vein in your leg.  Problems breastfeeding or a red or painful area on your breast.  Chest pain and cough or are gasping for air.  Problems coping with sadness, anxiety, or depression.  If you have any concerns about hurting yourself or the baby, call your provider immediately.   You have questions or concerns after you return home.     Keep your hands clean:  Always wash your hands before touching your perineal area and stitches.  This helps reduce your risk of infection.  If your hands aren't dirty, you may use an alcohol hand-rub to clean your hands. Keep your nails clean and short.        Warning Signs after Having a Baby    Keep this paper on your fridge or somewhere else where you can see it.    Call your provider if you have any of these symptoms up to 12 weeks after having your  baby.    Thoughts of hurting yourself or your baby  Pain in your chest or trouble breathing  Severe headache not helped by pain medicine  Eyesight concerns (blurry vision, seeing spots or flashes of light, other changes to eyesight)  Fainting, shaking or other signs of a seizure    Call 9-1-1 if you feel that it is an emergency.     The symptoms below can happen to anyone after giving birth. They can be very serious. Call your provider if you have any of these warning signs.    My provider s phone number: _______________________    Losing too much blood (hemorrhage)    Call your provider if you soak through a pad in less than an hour or pass blood clots bigger than a golf ball. These may be signs that you are bleeding too much.    Blood clots in the legs or lungs    After you give birth, your body naturally clots its blood to help prevent blood loss. Sometimes this increased clotting can happen in other areas of the body, like the legs or lungs. This can block your blood flow and be very dangerous.     Call your provider if you:  Have a red, swollen spot on the back of your leg that is warm or painful when you touch it.   Are coughing up blood.     Infection    Call your provider if you have any of these symptoms:  Fever of 100.4 F (38 C) or higher.  Pain or redness around your stitches if you had an incision.   Any yellow, white, or green fluid coming from places where you had stitches or surgery.    Mood Problems (postpartum depression)    Many people feel sad or have mood changes after having a baby. But for some people, these mood swings are worse.     Call your provider right away if you feel so anxious or nervous that you can't care for yourself or your baby.    Preeclampsia (high blood pressure)    Even if you didn't have high blood pressure when you were pregnant, you are at risk for the high blood pressure disease called preeclampsia. This risk can last up to 12 weeks after giving birth.     Call your  provider if you have:   Pain on your right side under your rib cage  Sudden swelling in the hands and face    Remember: You know your body. If something doesn't feel right, get medical help.     For informational purposes only. Not to replace the advice of your health care provider. Copyright 2020 [employer]. All rights reserved. Clinically reviewed by Ashlyn Flores, RNC-OB, MSN. [employer] 182187 - Rev 02/23.

## 2023-09-22 NOTE — DISCHARGE SUMMARY
[facility] Discharge Summary    Latasha Sim MRN# [id number]   Age: 23 year old YOB: 2000     Date of Admission:  2023  Date of Discharge::  2023  Admitting Physician:  ALEX Casas CNM  Discharge Physician:  ALEX Lay CNM, [place], [place]      Home clinic: [facility]          Admission Diagnoses:   Labor and delivery, indication for care [O75.9]          Discharge Diagnosis:     Normal spontaneous vaginal delivery          Procedures:     Procedure(s): No procedures performed            Medications Prior to Admission:     Medications Prior to Admission   Medication Sig Dispense Refill Last Dose    [DISCONTINUED] famotidine (PEPCID) 10 MG tablet Take 10 mg by mouth       [DISCONTINUED] prochlorperazine (COMPAZINE) 10 MG tablet Take 1 tablet (10 mg) by mouth every 6 hours as needed for nausea or vomiting 20 tablet 0              Discharge Medications:     Current Discharge Medication List        START taking these medications    Details   acetaminophen (TYLENOL) 325 MG tablet Take 2 tablets (650 mg) by mouth every 6 hours as needed for mild pain Start after Delivery.  Qty: 100 tablet, Refills: 0    Associated Diagnoses:  (normal spontaneous vaginal delivery)      ibuprofen (ADVIL/MOTRIN) 600 MG tablet Take 1 tablet (600 mg) by mouth every 6 hours as needed for moderate pain Start after delivery  Qty: 60 tablet, Refills: 0    Associated Diagnoses:  (normal spontaneous vaginal delivery)      senna-docusate (SENOKOT-S/PERICOLACE) 8.6-50 MG tablet Take 1 tablet by mouth daily Start after delivery.  Qty: 100 tablet, Refills: 0    Associated Diagnoses:  (normal spontaneous vaginal delivery)           STOP taking these medications       famotidine (PEPCID) 10 MG tablet Comments:   Reason for Stopping:         prochlorperazine (COMPAZINE) 10 MG tablet Comments:   Reason for Stopping:                     Consultations:   Consultation during this  admission received from Lactation          Brief History of Labor:   DELIVERY NOTE:  Latasha Sim is a 23 year old  at 40w0d [facility].  Labor progressed spontaneously to complete and +4.  Pushed effectively without coaching. Head delivered OA and restituted to NELSON . Tight nuchal cord x1, which was somersaulted through. Shoulders easily delivered under maternal effort.  Live female  delivered at 1926 over an intact perineum under no anesthesia.  Spontaneous breath, vigorous cry, well flexed, HR>100. Infant directly to maternal abdomen. Delayed cord clamping for >1 minutes then clamped x2 and cut by CNM. 20 units of pitocin infusing after baby with pt's consent. Cord blood obtained for typing. Intact placenta spontaneously delivered via Fox at 1930.  3 vessel cord. Fundus midline and firm with massage. Vagina, perineum, and rectum inspected, found to be intact.  Mother and infant stable. Good family bonding observed.      Delivery Note:   IUP at 40 weeks gestation delivered on 2023.     delivery of a viable Female infant.  Weight : 7 pounds 5 ounces   Apgars of 8 at 1 minute and 9 at 5 minutes.  Labor was spontaneous.  Medications administered  in labor:  Pain Rx Fentanyl and Nitrous Oxide; Antibiotics No  Perineum: Intact  Placenta-mechanism: spontaneous, intact,  with a 3 vessel cord. IV oxytocin was given After delivery of baby  Quantitative Blood Loss was 50cc.   Complications of labor and delivery: Nuchal cord  Anticipated Discharge Date: 2023  Birth attendants: Rod Lambert CNM; MD Rod Myers APRN CNM     Assessment Day of Discharge      Breasts: soft, filling  Nipples:intact, without lesion  Fundus: firm @ umbilicus-2, midline, mildly tender to touch  Abdomen: soft, nontender  Lochia: small rubra, no clots,  No odor  Perineum: intact, healing well, no erythema, edema, bruising, hematoma or s/s of infection  Legs:  trace edema, nontender           Hospital Course:     INTERVAL HISTORY:  /73 (BP Location: Left arm, Patient Position: Supine, Cuff Size: Adult Regular)   Pulse 56   Temp 98.6  F (37  C) (Oral)   Resp 16   Breastfeeding Unknown    No fever  Pt stable, baby girl is rooming in. Pt reports having worse abdominal pain this morning, denies concerns for heavy bleeding but states cramping is constant not only with breastfeeding.   Breast feeding status:initiated  Complications since 2 hours post delivery: increased abdominal pain  Patient is tolerating activity well, and normal diet. Voiding without difficulty, no bowel movement yet, cramping is relieved by hot pack, has worsened overnight, and is not fully relieved by Ibuprofen, lochia is decreasing and patient denies clots.  Perineal pain is is relieved by Ibuprofen.  The perineum is intact    Postpartum hemoglobin   Recent Labs   Lab 09/22/23  0915 09/21/23  0832 09/20/23  1356   HGB 11.6* 10.9* 11.3*      Blood type   No results found for: ABO, RH, [id number]   Rubella status: immune     History of depression: no. Postpartum depression warning signs reviewed.    ASSESSMENT/PLAN:  Normal postpartum exam , Stable Post-partum day #2  Complications:  # Hx of gestational diabetes, plan for 2 hour OGTT at 6 wks postpartum  # Abdominal pain - CBC ordered to r/o possible endometritis, WBC WNL, reviewed with Dr. Lee who agrees low suspicion for infection based on lab results and PE. Has remained afebrile. Reviewed warning s/s with patient.   Home Visit Ordered- Yes: routine   Plan d/c home today  Follow-up: 2 weeks with phone visit and 6-8 weeks in clinic  Teaching done: D/C Instructions: Nutrition/Activity, Engorgement Management, Birth Control Options, Warning Signs/When to Call: Excessive Bleeding, Infection, PP Depression, Kegals and Crunches, RTC Clinic for PP Appointment, and PNV     Reviewed blood pressure warning signs including headache, vision changes,  RUQ/epigastric pain, N&V, or sudden swelling.     Continue prenatal vitamins  Discharge medications ordered- Yes    Birthcontrol planned:Nexplanon           Discharge Instructions and Follow-Up:     Discharge diet: Regular   Discharge activity: Pelvic rest: abstain from intercourse and do not use tampons for 6 week(s)   Discharge follow-up: Follow up with OLENAM in 2 weeks, again 6 weeks    Wound care: Drink plenty of fluids  Ice to area for comfort  Tub soaks PRN           Discharge Disposition:     Discharged to home        ALEX Lay CNM

## 2023-12-01 ENCOUNTER — LAB REQUISITION (OUTPATIENT)
Dept: LAB | Facility: CLINIC | Age: 23
End: 2023-12-01
Payer: COMMERCIAL

## 2023-12-01 DIAGNOSIS — Z12.4 ENCOUNTER FOR SCREENING FOR MALIGNANT NEOPLASM OF CERVIX: ICD-10-CM

## 2023-12-01 DIAGNOSIS — Z01.411 ENCOUNTER FOR GYNECOLOGICAL EXAMINATION (GENERAL) (ROUTINE) WITH ABNORMAL FINDINGS: ICD-10-CM

## 2023-12-01 PROCEDURE — G0145 SCR C/V CYTO,THINLAYER,RESCR: HCPCS | Mod: ORL | Performed by: MIDWIFE

## 2023-12-05 LAB
BKR LAB AP GYN ADEQUACY: NORMAL
BKR LAB AP GYN INTERPRETATION: NORMAL
BKR LAB AP HPV REFLEX: NO
BKR LAB AP PREVIOUS ABNORMAL: NORMAL
PATH REPORT.COMMENTS IMP SPEC: NORMAL
PATH REPORT.COMMENTS IMP SPEC: NORMAL
PATH REPORT.RELEVANT HX SPEC: NORMAL

## 2024-11-01 ENCOUNTER — HOSPITAL ENCOUNTER (EMERGENCY)
Facility: CLINIC | Age: 24
Discharge: HOME | End: 2024-11-01
Attending: EMERGENCY MEDICINE | Admitting: EMERGENCY MEDICINE
Payer: COMMERCIAL

## 2024-11-01 VITALS
TEMPERATURE: 102.5 F | OXYGEN SATURATION: 99 % | SYSTOLIC BLOOD PRESSURE: 156 MMHG | DIASTOLIC BLOOD PRESSURE: 75 MMHG | HEART RATE: 120 BPM | RESPIRATION RATE: 18 BRPM

## 2024-11-01 DIAGNOSIS — J02.0 STREP PHARYNGITIS: ICD-10-CM

## 2024-11-01 LAB
FLUAV RNA SPEC QL NAA+PROBE: NEGATIVE
FLUBV RNA RESP QL NAA+PROBE: NEGATIVE
GROUP A STREP BY PCR: DETECTED
RSV RNA SPEC NAA+PROBE: NEGATIVE
SARS-COV-2 RNA RESP QL NAA+PROBE: NEGATIVE

## 2024-11-01 PROCEDURE — 87637 SARSCOV2&INF A&B&RSV AMP PRB: CPT | Performed by: STUDENT IN AN ORGANIZED HEALTH CARE EDUCATION/TRAINING PROGRAM

## 2024-11-01 PROCEDURE — 99283 EMERGENCY DEPT VISIT LOW MDM: CPT

## 2024-11-01 PROCEDURE — 87651 STREP A DNA AMP PROBE: CPT | Performed by: STUDENT IN AN ORGANIZED HEALTH CARE EDUCATION/TRAINING PROGRAM

## 2024-11-01 PROCEDURE — 250N000013 HC RX MED GY IP 250 OP 250 PS 637: Performed by: EMERGENCY MEDICINE

## 2024-11-01 RX ORDER — ACETAMINOPHEN 500 MG
1000 TABLET ORAL ONCE
Status: COMPLETED | OUTPATIENT
Start: 2024-11-01 | End: 2024-11-01

## 2024-11-01 RX ORDER — IBUPROFEN 600 MG/1
600 TABLET, FILM COATED ORAL ONCE
Status: COMPLETED | OUTPATIENT
Start: 2024-11-01 | End: 2024-11-01

## 2024-11-01 RX ORDER — CEPHALEXIN 500 MG/1
500 CAPSULE ORAL 2 TIMES DAILY
Qty: 20 CAPSULE | Refills: 0 | Status: SHIPPED | OUTPATIENT
Start: 2024-11-01 | End: 2024-11-02

## 2024-11-01 RX ADMIN — ACETAMINOPHEN 1000 MG: 500 TABLET ORAL at 18:40

## 2024-11-01 RX ADMIN — IBUPROFEN 600 MG: 600 TABLET ORAL at 18:40

## 2024-11-01 ASSESSMENT — ACTIVITIES OF DAILY LIVING (ADL)
ADLS_ACUITY_SCORE: 0

## 2024-11-01 NOTE — ED TRIAGE NOTES
Fever for three days, HA started today. Didn't take anything for medication. Pain in throat for three days.

## 2024-11-01 NOTE — ED PROVIDER NOTES
Emergency Department Note      History of Present Illness     Chief Complaint  Fever and Headache    HPI  Latasha Sim is a 24 year old female presents to the emergency department with a 2 to 3-day history of sore throat, fever, chills, mild headache.  She has not had cough, nausea, vomiting, or diarrhea.  No abdominal pain.  She has 3 small children, none of whom have apparently been significantly ill.  Interpretation is conducted via a [employer]  over the phone.    Independent Historian      Review of External Notes    Past Medical History   Medical History and Problem List  No past medical history on file.    Medications  acetaminophen (TYLENOL) 325 MG tablet  ibuprofen (ADVIL/MOTRIN) 600 MG tablet  senna-docusate (SENOKOT-S/PERICOLACE) 8.6-50 MG tablet        Surgical History   Past Surgical History:   Procedure Laterality Date    APPENDECTOMY       Physical Exam   Patient Vitals for the past 24 hrs:   BP Temp Pulse Resp SpO2   11/01/24 1600 (!) 156/75 (!) 102.5  F (39.2  C) 120 18 99 %     Physical Exam    General: Resting on the ED bed  Head:  The scalp, face, and head appear normal  Eyes:  The pupils are equal, round, and reactive to light    There is no nystagmus    Extraocular muscles are intact    Conjunctivae and sclerae are normal  ENT:    The nose is normal    Pinnae are normal    The oropharynx reveals bilateral exudative tonsillitis.  There is no peritonsillar abscess.  The uvula is in the midline.  Neck:  Normal range of motion    There is no rigidity noted  CV:  Regular rate  Normal underlying rhythm     Normal S1/S2    No pathological murmur detected  Resp:  Lungs are clear    There is no tachypnea    Non-labored    No rales    No wheezing   Skin:  No acute skin lesions noted  Neuro:  Speech is normal and fluent  There is no aphasia    No focal motor deficits    Cranial nerves are intact  Psych: Awake. Alert    Normal affect  Appropriate interactions  Lymph nodes: Mild anterior reactive  cervical lymphadenopathy.  No posterior cervical lymphadenopathy.    Diagnostics   Lab Results   Labs Ordered and Resulted from Time of ED Arrival to Time of ED Departure - No data to display    Imaging  No orders to display       EKG   No results found for this or any previous visit.    Independent Interpretation by Dr. Escobedo    ED Course    Medications Administered  Medications   ibuprofen (ADVIL/MOTRIN) tablet 600 mg (600 mg Oral $Given 11/1/24 1840)   acetaminophen (TYLENOL) tablet 1,000 mg (1,000 mg Oral $Given 11/1/24 1840)       Procedures  Procedures     Discussion of Management      Social Determinants of Health adding to complexity of care  None    ED Course     Medical Decision Making / Diagnosis   CMS Diagnoses: None    MIPS     None    MDM  Melidandsandi Sim is a 24 year old female presents to the emergency department with a 2 to 3-day history of sore throat, fever, low-grade chills, mild headache, without pulmonary or gastrointestinal symptoms.  The patient has physical exam evidence of acute tonsillopharyngitis.  Strep PCR came back positive and viral markers came back negative.  The patient will be started on antibiotics for the next 10 days, she does cite a penicillin allergy causing rash so she will be started on a cephalosporin.  No other life-threatening etiologies were detected.    Disposition  The patient was discharged.     ICD-10 Codes:    ICD-10-CM    1. Strep pharyngitis  J02.0            Discharge Medications  New Prescriptions    CEPHALEXIN (KEFLEX) 500 MG CAPSULE    Take 1 capsule (500 mg) by mouth 2 times daily for 10 days.         MD Gregg Jean Baptiste Michael P, MD  11/01/24 2008

## 2024-11-02 RX ORDER — CEPHALEXIN 500 MG/1
500 CAPSULE ORAL 2 TIMES DAILY
Qty: 20 CAPSULE | Refills: 0 | Status: SHIPPED | OUTPATIENT
Start: 2024-11-02

## 2024-11-02 NOTE — DISCHARGE INSTRUCTIONS
Please take your antibiotic as directed for 10 days you can use Tylenol or ibuprofen as needed for pain.  If your children become symptomatic, they should be tested for strep infection.

## 2025-02-18 ENCOUNTER — HOSPITAL ENCOUNTER (EMERGENCY)
Facility: CLINIC | Age: 25
Discharge: HOME | End: 2025-02-18
Attending: EMERGENCY MEDICINE | Admitting: EMERGENCY MEDICINE
Payer: COMMERCIAL

## 2025-02-18 VITALS
OXYGEN SATURATION: 99 % | HEART RATE: 83 BPM | RESPIRATION RATE: 20 BRPM | TEMPERATURE: 97 F | DIASTOLIC BLOOD PRESSURE: 79 MMHG | SYSTOLIC BLOOD PRESSURE: 121 MMHG

## 2025-02-18 DIAGNOSIS — R49.1 LOSS OF VOICE: ICD-10-CM

## 2025-02-18 DIAGNOSIS — U07.1 COVID-19: ICD-10-CM

## 2025-02-18 DIAGNOSIS — Z33.1 PREGNANT STATE, INCIDENTAL: ICD-10-CM

## 2025-02-18 LAB
FLUAV RNA SPEC QL NAA+PROBE: NEGATIVE
FLUBV RNA RESP QL NAA+PROBE: NEGATIVE
HCG UR QL: POSITIVE
RSV RNA SPEC NAA+PROBE: NEGATIVE
S PYO DNA THROAT QL NAA+PROBE: NOT DETECTED
SARS-COV-2 RNA RESP QL NAA+PROBE: POSITIVE

## 2025-02-18 PROCEDURE — 87651 STREP A DNA AMP PROBE: CPT | Performed by: EMERGENCY MEDICINE

## 2025-02-18 PROCEDURE — 87637 SARSCOV2&INF A&B&RSV AMP PRB: CPT | Performed by: EMERGENCY MEDICINE

## 2025-02-18 PROCEDURE — 99283 EMERGENCY DEPT VISIT LOW MDM: CPT | Performed by: EMERGENCY MEDICINE

## 2025-02-18 PROCEDURE — 81025 URINE PREGNANCY TEST: CPT | Performed by: EMERGENCY MEDICINE

## 2025-02-18 PROCEDURE — 250N000009 HC RX 250: Performed by: EMERGENCY MEDICINE

## 2025-02-18 RX ORDER — DEXAMETHASONE SODIUM PHOSPHATE 10 MG/ML
10 INJECTION, SOLUTION INTRAMUSCULAR; INTRAVENOUS ONCE
Status: COMPLETED | OUTPATIENT
Start: 2025-02-18 | End: 2025-02-18

## 2025-02-18 RX ADMIN — DEXAMETHASONE SODIUM PHOSPHATE 10 MG: 10 INJECTION, SOLUTION INTRAMUSCULAR; INTRAVENOUS at 07:56

## 2025-02-18 ASSESSMENT — COLUMBIA-SUICIDE SEVERITY RATING SCALE - C-SSRS
2. HAVE YOU ACTUALLY HAD ANY THOUGHTS OF KILLING YOURSELF IN THE PAST MONTH?: NO
6. HAVE YOU EVER DONE ANYTHING, STARTED TO DO ANYTHING, OR PREPARED TO DO ANYTHING TO END YOUR LIFE?: NO
1. IN THE PAST MONTH, HAVE YOU WISHED YOU WERE DEAD OR WISHED YOU COULD GO TO SLEEP AND NOT WAKE UP?: NO

## 2025-02-18 NOTE — DISCHARGE INSTRUCTIONS
As we discussed it does look like the cause of the loss of voice is likely due to COVID, and for this we did give you a dose of steroids which should help with some of the inflammation in the back of your throat.  Please come back to the ER immediately with any additional respiratory symptoms that you have, and do follow-up with your regular doctor in the next 1 week, this is important for checkup.  We also did discuss how your pregnancy test here shows as a positive and that you need to follow with your OB/GYN in the next 1 week as well to go over next steps.  If you do use over-the-counter medication please only use Tylenol, do not use Motrin if you are pregnant.

## 2025-02-18 NOTE — ED PROVIDER NOTES
Emergency Department Note      History of Present Illness     Chief Complaint  Pharyngitis    HPI  Latasha Sim is a 25 year old female who presents to the emergency room with what appears to be sore throat and loss of voice for about 4 days.  She is here with her  who has identical symptoms but much less severe, and he has tested as negative for COVID.  She denies any chest pain, does have mild cough, no abdominal pain or vomiting.  States her last menstrual period was over a month ago.  Is able to do her normal activities just is quite tired.  She states that she is not really able to speak loudly above a whisper either      Independent Historian  Yes patient's  at the bedside and confirms the above history    Review of External Notes  Yes I have reviewed the patient's last office visit with the patient was seen on 11-5-2024 for a throat problem and fever at that time.      Past Medical History   Medical History and Problem List  No past medical history on file.    Medications  acetaminophen (TYLENOL) 325 MG tablet  cephALEXin (KEFLEX) 500 MG capsule  ibuprofen (ADVIL/MOTRIN) 600 MG tablet  senna-docusate (SENOKOT-S/PERICOLACE) 8.6-50 MG tablet        Surgical History   Past Surgical History:   Procedure Laterality Date    APPENDECTOMY           Physical Exam   Patient Vitals for the past 24 hrs:   BP Temp Temp src Pulse Resp SpO2   02/18/25 0625 121/79 97  F (36.1  C) Temporal 83 20 99 %       Physical Exam  Vitals: reviewed by me  General: Pt seen on [facility], pleasant, cooperative, and alert to conversation  Eyes: Tracking well, clear conjunctiva BL  ENT: Very hoarse sounding voice, no stridor, no cough, can speak at a whisper.  No respiratory distress.  No stridor.  Lungs: No tachypnea, no accessory muscle use. No respiratory distress.   CV: Rate as above  MSK: no joint effusion.  No evidence of trauma  Skin: No rash  Neuro: Clear speech and no facial droop.  Psych: Not RIS, no e/o  /      Diagnostics   Lab Results   Labs Ordered and Resulted from Time of ED Arrival to Time of ED Departure   INFLUENZA A/B, RSV AND SARS-COV2 PCR - Abnormal       Result Value    Influenza A PCR Negative      Influenza B PCR Negative      RSV PCR Negative      SARS CoV2 PCR Positive (*)    HCG QUALITATIVE URINE - Abnormal    hCG Urine Qualitative Positive (*)    GROUP A STREPTOCOCCUS PCR THROAT SWAB - Normal    Group A strep by PCR Not Detected           ED Course      Medications Administered   Medications   dexAMETHasone (PF) (DECADRON) injectable solution used ORALLY 10 mg (has no administration in time range)                Optional/Additional Documentation  None      Medical Decision Making / Diagnosis         MDM  This is a very pleasant 25-year-old female who presents to the emergency room with what appears to be a sore throat, and she is COVID-positive.  She is also incidentally pregnant as well and has had symptoms for about 4 days putting her at the very end of the Paxlovid window.  I do not think that it would be particularly helpful to take the Paxlovid at this time, and I have recommended symptomatic relief instead.  Of asked the patient to follow with her regular doctor in the week ahead as well as with her OB/GYN in the week ahead and she tells me that she feels comfortable doing this.  I did give a dose of Decadron given how hoarse she is and we went over red flags for when to come back to the ER.  Patient is highly reliable appearing and I do think that she will come back to the ER if anything gets worse.    ICD-10 Codes:    ICD-10-CM    1. Loss of voice  R49.1       2. COVID-19  U07.1       3. Pregnant state, incidental  Z33.1              Discharge Medications  Discharge Medication List as of 2/18/2025  7:52 AM                     Geronimo Soliman MD  02/18/25 3056

## 2025-02-18 NOTE — ED TRIAGE NOTES
Cough, sore throat, loss of voice for 4 days     Triage Assessment (Adult)       Row Name 02/18/25 0625          Triage Assessment    Airway WDL WDL        Respiratory WDL    Respiratory WDL all     Rhythm/Pattern, Respiratory unlabored;pattern regular;depth regular;shortness of breath     Cough Frequency frequent        Cardiac WDL    Cardiac WDL WDL        Cognitive/Neuro/Behavioral WDL    Cognitive/Neuro/Behavioral WDL WDL

## 2025-05-20 ENCOUNTER — HOSPITAL ENCOUNTER (EMERGENCY)
Facility: CLINIC | Age: 25
End: 2025-05-20
Payer: COMMERCIAL

## 2025-05-20 ENCOUNTER — HOSPITAL ENCOUNTER (OUTPATIENT)
Facility: CLINIC | Age: 25
Discharge: HOME | End: 2025-05-20
Attending: SPECIALIST | Admitting: SPECIALIST
Payer: COMMERCIAL

## 2025-05-20 ENCOUNTER — HOSPITAL ENCOUNTER (OUTPATIENT)
Facility: CLINIC | Age: 25
End: 2025-05-20
Admitting: SPECIALIST
Payer: COMMERCIAL

## 2025-05-20 VITALS — RESPIRATION RATE: 16 BRPM | SYSTOLIC BLOOD PRESSURE: 115 MMHG | DIASTOLIC BLOOD PRESSURE: 66 MMHG | TEMPERATURE: 97.8 F

## 2025-05-20 PROBLEM — Z36.89 ENCOUNTER FOR TRIAGE IN PREGNANT PATIENT: Status: ACTIVE | Noted: 2025-05-20

## 2025-05-20 LAB
CLUE CELLS: NORMAL
RUPTURE OF FETAL MEMBRANES BY ROM PLUS: POSITIVE
TRICHOMONAS, WET PREP: NORMAL
WBC'S/HIGH POWER FIELD, WET PREP: NORMAL
YEAST, WET PREP: NORMAL

## 2025-05-20 PROCEDURE — 84112 EVAL AMNIOTIC FLUID PROTEIN: CPT | Performed by: SPECIALIST

## 2025-05-20 PROCEDURE — G0463 HOSPITAL OUTPT CLINIC VISIT: HCPCS

## 2025-05-20 PROCEDURE — 87210 SMEAR WET MOUNT SALINE/INK: CPT | Performed by: SPECIALIST

## 2025-05-20 RX ORDER — LIDOCAINE 40 MG/G
CREAM TOPICAL
Status: DISCONTINUED | OUTPATIENT
Start: 2025-05-20 | End: 2025-05-20 | Stop reason: HOSPADM

## 2025-05-20 ASSESSMENT — ACTIVITIES OF DAILY LIVING (ADL)
CONCENTRATING,_REMEMBERING_OR_MAKING_DECISIONS_DIFFICULTY: NO
DRESSING/BATHING_DIFFICULTY: NO
DIFFICULTY_EATING/SWALLOWING: NO
HEARING_DIFFICULTY_OR_DEAF: NO
FALL_HISTORY_WITHIN_LAST_SIX_MONTHS: NO
WALKING_OR_CLIMBING_STAIRS_DIFFICULTY: NO
DOING_ERRANDS_INDEPENDENTLY_DIFFICULTY: NO
ADLS_ACUITY_SCORE: 24
DIFFICULTY_COMMUNICATING: NO
WEAR_GLASSES_OR_BLIND: NO
CHANGE_IN_FUNCTIONAL_STATUS_SINCE_ONSET_OF_CURRENT_ILLNESS/INJURY: NO
ADLS_ACUITY_SCORE: 24
TOILETING_ISSUES: NO

## 2025-05-21 NOTE — PROGRESS NOTES
Data: Patient assessed in the [facility] for leaking vaginal fluid. Cervical exam deferred. Membranes intact. Contractions are not present. See flowsheets for fetal assessment documentation.     Action: Presumed adequate fetal oxygenation documented. Discharge instructions reviewed. Patient instructed to report change in fetal movement, vaginal leaking of fluid or bleeding, abdominal pain, or any concerns related to the pregnancy to provider/clinic.      Response: Orders to discharge home per Dr Bruno. Patient verbalized understanding of education and agreement with plan. Discharged to home at 2109.

## 2025-05-21 NOTE — PROGRESS NOTES
Data: Patient presented to [facility]: 2025  7:42 PM.  Reason for maternal/fetal assessment is leaking vaginal fluid. Patient reports has had this happen before this pregnancy but noted some clear discharge today at 1700. Patient denies uterine contractions, vaginal bleeding. Patient reports fetal movement is normal for gestational age. Patient is a 19w0d .  Prenatal record reviewed. Pregnancy has been uncomplicated.    Vital signs wnl. Support person is present.     Action: Verbal consent for EFM. Triage assessment completed.     Response: Patient verbalized agreement with plan. Will contact Dr Bruno with update and further orders.     used with patient.

## 2025-05-21 NOTE — PROGRESS NOTES
OB MAC note    Latasha is a 26 yo multiparous female who is seen by [facility], currently at 19 weeks with known oligohydramnios diagnosed at 18w1d who presents after a gush of fluid at 5 pm.  She has had intermittent gushes for the past two weeks but negative amnisure.  She denies fever, chills, vaginal bleeding, abdominal pain or contractions.  She was seen yesterday in clinic with a plan to see MFM next week.    Her ROM plus here is positive.    Her  is present and speaks for her.  Interview was done through the  (ipad).      /66 (BP Location: Left arm, Patient Position: Sitting, Cuff Size: Adult Regular)   Temp 97.8  F (36.6  C) (Temporal)   Resp 16   Gen alert NAD  Abd soft/gravid/NT  Extr neg     bpm    SVE deferred    Imp: 26 yo  female currently at 19 weeks with known oligohydramnios and suspected PPROM, now confirmed.  No evidence of  labor or infection.  Fetus is active with cardiac activity.    Plan: Reviewed results that confirm that she is likely ruptured and leaking fluid.  Discussed that sometimes this can lead to infection and/or  labor/delivery.  If infection is present that can be dangerous for mom and baby.  Discussed that there is no treatment to prevent further leakage of fluid but that sometimes it can seal on its own.  They do not want to stay for further monitoring but would like to follow up as scheduled with MFM to further review options.  Reviewed the need to return to the hospital or call their physician with any fever, chills, abdominal pain, contractions, vaginal bleeding, or s/sx labor.  He verbalized understanding of our conversation with the , and requests discharge.  She was also present for the conversation.

## 2025-05-21 NOTE — DISCHARGE INSTRUCTIONS
Learning About When to Call Your Doctor During Pregnancy (Up to 20 Weeks)  Overview     It's common to have concerns about what might be a problem during your pregnancy. Most pregnancies don't have any serious problems. But it's still important to know when to call your doctor if you have certain symptoms.  These are general suggestions. Your doctor may give you some more information about when to call.  When to call your doctor (up to 20 weeks)  Call 911 anytime you think you may need emergency care. For example, call if:  You have severe vaginal bleeding. This means you are soaking through a pad each hour for 2 or more hours.  You have chest pain, are short of breath, or cough up blood.  You have sudden, severe pain in your belly.  You passed out (lost consciousness).  Call your doctor now or seek immediate medical care if:  You have a fever.  You have vaginal bleeding.  You are dizzy or lightheaded, or you feel like you may faint.  You have signs of a blood clot in your leg (called a deep vein thrombosis), such as:  Pain in the calf, back of the knee, thigh, or groin.  Swelling in your leg or groin.  A color change on the leg or groin. The skin may be reddish or purplish, depending on your usual skin color.  You have symptoms of a urinary tract infection. These may include:  Pain or burning when you urinate.  A frequent need to urinate without being able to pass much urine.  Pain in the flank, which is just below the rib cage and above the waist on either side of the back.  Blood in your urine.  You have belly pain.  You think you are having contractions.  Watch closely for changes in your health, and be sure to contact your doctor if:  You have vaginal discharge that smells bad.  You feel sad, anxious, or hopeless for more than a few days.  You have other concerns about your pregnancy.  Follow-up care is a key part of your treatment and safety. Be sure to make and go to all appointments, and call your doctor  "if you are having problems. It's also a good idea to know your test results and keep a list of the medicines you take.  Where can you learn more?  Go to https://www.[employer].net/patiented  Enter G674 in the search box to learn more about \"Learning About When to Call Your Doctor During Pregnancy (Up to 20 Weeks).\"  Current as of: April 30, 2024  Content Version: 14.4    4867-8624 [employer].   Care instructions adapted under license by your healthcare professional. If you have questions about a medical condition or this instruction, always ask your healthcare professional. [employer] disclaims any warranty or liability for your use of this information.    "

## 2025-07-12 ENCOUNTER — HOSPITAL ENCOUNTER (EMERGENCY)
Facility: CLINIC | Age: 25
Discharge: HOME | End: 2025-07-12
Attending: EMERGENCY MEDICINE | Admitting: EMERGENCY MEDICINE
Payer: COMMERCIAL

## 2025-07-12 VITALS
BODY MASS INDEX: 27.94 KG/M2 | RESPIRATION RATE: 18 BRPM | OXYGEN SATURATION: 100 % | HEART RATE: 81 BPM | TEMPERATURE: 97.6 F | SYSTOLIC BLOOD PRESSURE: 105 MMHG | WEIGHT: 178 LBS | HEIGHT: 67 IN | DIASTOLIC BLOOD PRESSURE: 70 MMHG

## 2025-07-12 DIAGNOSIS — Z30.431 CHECKING OF INTRAUTERINE DEVICE: ICD-10-CM

## 2025-07-12 PROCEDURE — 99284 EMERGENCY DEPT VISIT MOD MDM: CPT

## 2025-07-12 ASSESSMENT — ACTIVITIES OF DAILY LIVING (ADL): ADLS_ACUITY_SCORE: 50

## 2025-07-12 NOTE — ED PROVIDER NOTES
"  Emergency Department Note      History of Present Illness     Chief Complaint   IUD check     HPI [employer]  was used.   Latasha Sim is a 25 year old female presenting to the ED for a IUD check. The patient and her  are concerned that the IUD placed 1 month ago may have fallen out. The patient can't feel the IUD strings. The IUD was checked during a post-op visit on 7/3/25 and the strings were seen to be in a good position. The patient had the IUD placed on 6/13/25 after a D&E for previable PPROM. Denies fever, pelvic pain, vaginal discharge, UTI symptoms, or abdominal pain.     Independent Historian    also present.     Review of External Notes   none    Past Medical History     Medical History and Problem List   Vitamin D deficiency   H. Pylori infection  GDM  Hyperemesis during pregnancy     Medications   Keflex   Senokot     Surgical History   Appendectomy   Dilation and evacuation     Physical Exam     Patient Vitals for the past 24 hrs:   BP Temp Pulse Resp SpO2 Height Weight   07/12/25 1400 104/70 97.6  F (36.4  C) 84 18 98 % 1.69 m (5' 6.54\") 80.7 kg (178 lb)     Physical Exam  Nursing note and vitals reviewed.  Constitutional:  Appears well-developed and well-nourished.   HENT:   Head:    Atraumatic.   Mouth/Throat:   Oropharynx is clear and moist. No oropharyngeal exudate.   Eyes:    Pupils are equal, round, and reactive to light.   Neck:    Normal range of motion. Neck supple.      No tracheal deviation present. No thyromegaly present.   Cardiovascular:  Normal rate, regular rhythm, no murmur   Pulmonary/Chest: Breath sounds are clear and equal without wheezes or crackles.  Abdominal:   Soft. Bowel sounds are normal. Exhibits no distension and      no mass. There is no tenderness.      There is no rebound and no guarding.   Pelvic: external genitalia normal. Vaginal vault normal. IUD strings are seen in good position exiting the cervix. No cervical motion tenderness. No blood. "   Musculoskeletal:  Exhibits no edema.   Lymphadenopathy:  No cervical adenopathy.   Neurological:   Alert and oriented to person, place, and time.   Skin:    Skin is warm and dry. No rash noted. No pallor.       Diagnostics     Lab Results   Labs Ordered and Resulted from Time of ED Arrival to Time of ED Departure - No data to display    Imaging   No orders to display       EKG   None    Independent Interpretation   None    ED Course      Medications Administered   Medications - No data to display    Procedures   Procedures     Discussion of Management   None    ED Course   ED Course as of 07/12/25 1418   Sat Jul 12, 2025   1400 I obtained history and examined the patient as noted above.         Additional Documentation  None    Medical Decision Making / Diagnosis     CMS Diagnoses: None    MIPS   None               MDM   Melidandsandi Sim is a 25 year old female who arrives to the emergency department for an IUD check since she feels that it may have fallen out since she cannot feel the strings.  I performed a pelvic exam and can see the strings in good position at the cervical opening.  She has no cervical motion tenderness and does not have any symptoms concerning for pelvic infection or pregnancy.  I felt she be safely discharged home and she was told to follow-up with her primary care doctor as needed and return as needed.    Disposition   The patient was discharged.     Diagnosis     ICD-10-CM    1. Checking of intrauterine device  Z30.431            Discharge Medications   New Prescriptions    No medications on file         Scribe Disclosure:  I, Noe Lambert, am serving as a scribe at 2:13 PM on 7/12/2025 to document services personally performed by Mandie Garces MD based on my observations and the provider's statements to me.        Mandie Garces MD  07/12/25 [id number]

## 2025-07-21 ENCOUNTER — HOSPITAL ENCOUNTER (EMERGENCY)
Facility: CLINIC | Age: 25
Discharge: HOME | End: 2025-07-22
Admitting: PHYSICIAN ASSISTANT
Payer: COMMERCIAL

## 2025-07-21 VITALS
SYSTOLIC BLOOD PRESSURE: 117 MMHG | DIASTOLIC BLOOD PRESSURE: 79 MMHG | OXYGEN SATURATION: 99 % | TEMPERATURE: 97.7 F | RESPIRATION RATE: 16 BRPM | HEART RATE: 84 BPM

## 2025-07-21 PROCEDURE — 99281 EMR DPT VST MAYX REQ PHY/QHP: CPT

## 2025-07-21 ASSESSMENT — COLUMBIA-SUICIDE SEVERITY RATING SCALE - C-SSRS
6. HAVE YOU EVER DONE ANYTHING, STARTED TO DO ANYTHING, OR PREPARED TO DO ANYTHING TO END YOUR LIFE?: NO
1. IN THE PAST MONTH, HAVE YOU WISHED YOU WERE DEAD OR WISHED YOU COULD GO TO SLEEP AND NOT WAKE UP?: NO
2. HAVE YOU ACTUALLY HAD ANY THOUGHTS OF KILLING YOURSELF IN THE PAST MONTH?: NO

## 2025-07-21 ASSESSMENT — ACTIVITIES OF DAILY LIVING (ADL)
ADLS_ACUITY_SCORE: 50

## 2025-07-22 ASSESSMENT — ACTIVITIES OF DAILY LIVING (ADL)
ADLS_ACUITY_SCORE: 50
ADLS_ACUITY_SCORE: 50

## 2025-07-22 NOTE — ED TRIAGE NOTES
Patient had an IUD placed approx 1 month ago and states it has moved and is causing some pain. She is currently on their period and wants the IUD taken out.

## 2025-07-22 NOTE — ED NOTES
Patient left the emergency department against medical advice due to there not being a female physician on duty. Patient would also not allow writer to obtain discharge vitals as she stated that men are not allowed to touch her.     Writer educated the patient in regard to the importance that she go to another emergency department if she believes she is having an emergency.